# Patient Record
Sex: FEMALE | Race: WHITE | Employment: OTHER | RURAL
[De-identification: names, ages, dates, MRNs, and addresses within clinical notes are randomized per-mention and may not be internally consistent; named-entity substitution may affect disease eponyms.]

---

## 2022-03-18 PROBLEM — J45.20 MILD INTERMITTENT ASTHMA WITHOUT COMPLICATION: Status: ACTIVE | Noted: 2021-11-03

## 2022-10-04 PROBLEM — S83.282A ACUTE LATERAL MENISCUS TEAR OF LEFT KNEE: Status: RESOLVED | Noted: 2022-10-03 | Resolved: 2022-10-04

## 2023-10-12 ENCOUNTER — TRANSCRIBE ORDERS (OUTPATIENT)
Facility: HOSPITAL | Age: 73
End: 2023-10-12

## 2023-10-12 ENCOUNTER — HOSPITAL ENCOUNTER (OUTPATIENT)
Facility: HOSPITAL | Age: 73
Discharge: HOME | End: 2023-10-12
Payer: MEDICARE

## 2023-10-12 DIAGNOSIS — M99.03 SOMATIC DYSFUNCTION OF LUMBAR REGION: Primary | ICD-10-CM

## 2023-10-12 DIAGNOSIS — M99.05 SOMATIC DYSFUNCTION OF PELVIS REGION: ICD-10-CM

## 2023-10-12 DIAGNOSIS — M99.03 SOMATIC DYSFUNCTION OF LUMBAR REGION: ICD-10-CM

## 2023-10-12 PROCEDURE — 73502 X-RAY EXAM HIP UNI 2-3 VIEWS: CPT

## 2023-10-12 PROCEDURE — 72110 X-RAY EXAM L-2 SPINE 4/>VWS: CPT

## 2023-10-24 ENCOUNTER — TRANSCRIBE ORDERS (OUTPATIENT)
Dept: SCHEDULING | Age: 73
End: 2023-10-24

## 2023-10-24 DIAGNOSIS — M54.50 LUMBAR PAIN: Primary | ICD-10-CM

## 2023-10-31 ENCOUNTER — HOSPITAL ENCOUNTER (OUTPATIENT)
Facility: HOSPITAL | Age: 73
Discharge: HOME | End: 2023-11-03
Attending: ORTHOPAEDIC SURGERY
Payer: MEDICARE

## 2023-10-31 DIAGNOSIS — M16.11 PRIMARY OSTEOARTHRITIS OF RIGHT HIP: ICD-10-CM

## 2023-10-31 DIAGNOSIS — M54.16 LUMBAR BACK PAIN WITH RADICULOPATHY AFFECTING LOWER EXTREMITY: ICD-10-CM

## 2023-10-31 DIAGNOSIS — M54.40 ACUTE RIGHT-SIDED LOW BACK PAIN WITH SCIATICA, SCIATICA LATERALITY UNSPECIFIED: ICD-10-CM

## 2023-10-31 PROCEDURE — 73721 MRI JNT OF LWR EXTRE W/O DYE: CPT

## 2023-10-31 PROCEDURE — 72148 MRI LUMBAR SPINE W/O DYE: CPT

## 2023-11-01 DIAGNOSIS — M25.551 RIGHT HIP PAIN: Primary | ICD-10-CM

## 2023-11-01 RX ORDER — TRAMADOL HYDROCHLORIDE 50 MG/1
50 TABLET ORAL EVERY 6 HOURS PRN
Qty: 20 TABLET | Refills: 0 | Status: SHIPPED | OUTPATIENT
Start: 2023-11-01 | End: 2023-11-06

## 2023-11-01 NOTE — PROGRESS NOTES
With patient is followed by me in the office for intractable right hip pain. This has intensified and we are still waiting on MRI scan results of the hip. Pain medication was called in for her until we evaluate the problem better with MRI scan.

## 2023-12-08 ENCOUNTER — OFFICE VISIT (OUTPATIENT)
Age: 73
End: 2023-12-08
Payer: MEDICARE

## 2023-12-08 VITALS
SYSTOLIC BLOOD PRESSURE: 136 MMHG | TEMPERATURE: 97.5 F | WEIGHT: 145.8 LBS | BODY MASS INDEX: 25.83 KG/M2 | HEIGHT: 63 IN | HEART RATE: 100 BPM | OXYGEN SATURATION: 95 % | RESPIRATION RATE: 18 BRPM | DIASTOLIC BLOOD PRESSURE: 78 MMHG

## 2023-12-08 DIAGNOSIS — M16.0 PRIMARY OSTEOARTHRITIS OF BOTH HIPS: ICD-10-CM

## 2023-12-08 DIAGNOSIS — Z00.00 MEDICARE ANNUAL WELLNESS VISIT, SUBSEQUENT: Primary | ICD-10-CM

## 2023-12-08 DIAGNOSIS — R94.31 ABNORMAL EKG: ICD-10-CM

## 2023-12-08 DIAGNOSIS — Z01.818 PRE-OP EVALUATION: ICD-10-CM

## 2023-12-08 DIAGNOSIS — Z12.31 ENCOUNTER FOR SCREENING MAMMOGRAM FOR MALIGNANT NEOPLASM OF BREAST: ICD-10-CM

## 2023-12-08 PROCEDURE — G0439 PPPS, SUBSEQ VISIT: HCPCS | Performed by: FAMILY MEDICINE

## 2023-12-08 PROCEDURE — 1123F ACP DISCUSS/DSCN MKR DOCD: CPT | Performed by: FAMILY MEDICINE

## 2023-12-08 SDOH — ECONOMIC STABILITY: INCOME INSECURITY: HOW HARD IS IT FOR YOU TO PAY FOR THE VERY BASICS LIKE FOOD, HOUSING, MEDICAL CARE, AND HEATING?: NOT HARD AT ALL

## 2023-12-08 SDOH — ECONOMIC STABILITY: FOOD INSECURITY: WITHIN THE PAST 12 MONTHS, THE FOOD YOU BOUGHT JUST DIDN'T LAST AND YOU DIDN'T HAVE MONEY TO GET MORE.: NEVER TRUE

## 2023-12-08 SDOH — ECONOMIC STABILITY: FOOD INSECURITY: WITHIN THE PAST 12 MONTHS, YOU WORRIED THAT YOUR FOOD WOULD RUN OUT BEFORE YOU GOT MONEY TO BUY MORE.: NEVER TRUE

## 2023-12-08 SDOH — ECONOMIC STABILITY: HOUSING INSECURITY
IN THE LAST 12 MONTHS, WAS THERE A TIME WHEN YOU DID NOT HAVE A STEADY PLACE TO SLEEP OR SLEPT IN A SHELTER (INCLUDING NOW)?: NO

## 2023-12-08 ASSESSMENT — LIFESTYLE VARIABLES
HOW MANY STANDARD DRINKS CONTAINING ALCOHOL DO YOU HAVE ON A TYPICAL DAY: 1 OR 2
HOW OFTEN DO YOU HAVE A DRINK CONTAINING ALCOHOL: MONTHLY OR LESS

## 2023-12-08 ASSESSMENT — PATIENT HEALTH QUESTIONNAIRE - PHQ9
SUM OF ALL RESPONSES TO PHQ9 QUESTIONS 1 & 2: 0
SUM OF ALL RESPONSES TO PHQ QUESTIONS 1-9: 0
SUM OF ALL RESPONSES TO PHQ QUESTIONS 1-9: 0
2. FEELING DOWN, DEPRESSED OR HOPELESS: 0
SUM OF ALL RESPONSES TO PHQ QUESTIONS 1-9: 0
1. LITTLE INTEREST OR PLEASURE IN DOING THINGS: 0
SUM OF ALL RESPONSES TO PHQ QUESTIONS 1-9: 0

## 2023-12-08 NOTE — PATIENT INSTRUCTIONS
Flu shot, RSV vaccine and new COVID omicron vaccines due this fall/ winter at your pharmacy. Please get those when available, they can help prevent severe lung disease. We also recommend a bone density test, let us know when you are ready to get that ordered. We have ordered your mammogram, you can schedule that when ready. Advance Directives: Care Instructions  Overview  An advance directive is a legal way to state your wishes at the end of your life. It tells your family and your doctor what to do if you can't say what you want. There are two main types of advance directives. You can change them any time your wishes change. Living will. This form tells your family and your doctor your wishes about life support and other treatment. The form is also called a declaration. Medical power of . This form lets you name a person to make treatment decisions for you when you can't speak for yourself. This person is called a health care agent (health care proxy, health care surrogate). The form is also called a durable power of  for health care. If you do not have an advance directive, decisions about your medical care may be made by a family member, or by a doctor or a  who doesn't know you. It may help to think of an advance directive as a gift to the people who care for you. If you have one, they won't have to make tough decisions by themselves. For more information, including forms for your state, see the [address] [place] [address] website (www.caringinfo.org/planning/advance-directives/). Follow-up care is a key part of your treatment and safety. Be sure to make and go to all appointments, and call your doctor if you are having problems. It's also a good idea to know your test results and keep a list of the medicines you take. What should you include in an advance directive? Many states have a unique advance directive form.  (It may ask you to address specific issues.) Or you might use a universal form

## 2023-12-15 ENCOUNTER — HOSPITAL ENCOUNTER (OUTPATIENT)
Facility: HOSPITAL | Age: 73
End: 2023-12-15
Attending: FAMILY MEDICINE
Payer: MEDICARE

## 2023-12-15 DIAGNOSIS — R94.31 ABNORMAL EKG: ICD-10-CM

## 2023-12-15 LAB
ECHO AO ROOT DIAM: 3 CM
ECHO AV MEAN GRADIENT: 3 MMHG
ECHO AV MEAN VELOCITY: 0.8 M/S
ECHO AV PEAK GRADIENT: 5 MMHG
ECHO AV PEAK VELOCITY: 1.1 M/S
ECHO AV VTI: 25.4 CM
ECHO EST RA PRESSURE: 3 MMHG
ECHO LA DIAMETER: 2.8 CM
ECHO LA TO AORTIC ROOT RATIO: 0.93
ECHO LA VOL A-L A2C: 71 ML (ref 22–52)
ECHO LA VOL A-L A4C: 51 ML (ref 22–52)
ECHO LA VOL MOD A2C: 67 ML (ref 22–52)
ECHO LA VOL MOD A4C: 48 ML (ref 22–52)
ECHO LA VOLUME AREA LENGTH: 64 ML
ECHO LV E' LATERAL VELOCITY: 5 CM/S
ECHO LV E' SEPTAL VELOCITY: 4 CM/S
ECHO LV FRACTIONAL SHORTENING: 30 % (ref 28–44)
ECHO LV INTERNAL DIMENSION DIASTOLIC: 4.3 CM (ref 3.9–5.3)
ECHO LV INTERNAL DIMENSION SYSTOLIC: 3 CM
ECHO LV IVSD: 1.1 CM (ref 0.6–0.9)
ECHO LV MASS 2D: 173.6 G (ref 67–162)
ECHO LV POSTERIOR WALL DIASTOLIC: 1.2 CM (ref 0.6–0.9)
ECHO LV RELATIVE WALL THICKNESS RATIO: 0.56
ECHO LVOT AREA: 3.5 CM2
ECHO LVOT DIAM: 2.1 CM
ECHO MV A VELOCITY: 0.86 M/S
ECHO MV E DECELERATION TIME (DT): 264.4 MS
ECHO MV E VELOCITY: 0.53 M/S
ECHO MV E/A RATIO: 0.62
ECHO MV E/E' LATERAL: 10.6
ECHO MV E/E' RATIO (AVERAGED): 11.93
ECHO PULMONARY ARTERY END DIASTOLIC PRESSURE: 4 MMHG
ECHO PULMONARY ARTERY SYSTOLIC PRESSURE (PASP): 18 MMHG
ECHO PV REGURGITANT MAX VELOCITY: 1 M/S
ECHO RA AREA 4C: 12.6 CM2
ECHO RIGHT VENTRICULAR SYSTOLIC PRESSURE (RVSP): 18 MMHG
ECHO RV TAPSE: 2.4 CM (ref 1.7–?)
ECHO TV REGURGITANT MAX VELOCITY: 1.93 M/S
ECHO TV REGURGITANT PEAK GRADIENT: 15 MMHG

## 2023-12-15 PROCEDURE — 93306 TTE W/DOPPLER COMPLETE: CPT

## 2023-12-15 NOTE — RESULT ENCOUNTER NOTE
Echocardiogram looks good overall. Signs of thickening of the left heart, mild, not a concern for the surgery.

## 2023-12-25 ENCOUNTER — HOSPITAL ENCOUNTER (EMERGENCY)
Facility: HOSPITAL | Age: 73
Discharge: ANOTHER HOSPITAL | End: 2023-12-26
Attending: FAMILY MEDICINE
Payer: MEDICARE

## 2023-12-25 ENCOUNTER — APPOINTMENT (OUTPATIENT)
Facility: HOSPITAL | Age: 73
End: 2023-12-25
Payer: MEDICARE

## 2023-12-25 VITALS
HEART RATE: 99 BPM | TEMPERATURE: 99.8 F | BODY MASS INDEX: 27.42 KG/M2 | OXYGEN SATURATION: 91 % | RESPIRATION RATE: 16 BRPM | WEIGHT: 149 LBS | HEIGHT: 62 IN | SYSTOLIC BLOOD PRESSURE: 120 MMHG | DIASTOLIC BLOOD PRESSURE: 71 MMHG

## 2023-12-25 DIAGNOSIS — R09.02 HYPOXIA: ICD-10-CM

## 2023-12-25 DIAGNOSIS — K55.9 ISCHEMIC COLITIS (HCC): Primary | ICD-10-CM

## 2023-12-25 LAB
ALBUMIN SERPL-MCNC: 3 G/DL (ref 3.5–5)
ALBUMIN/GLOB SERPL: 0.9 (ref 1.1–2.2)
ALP SERPL-CCNC: 69 U/L (ref 45–117)
ALT SERPL-CCNC: 17 U/L (ref 12–78)
ANION GAP SERPL CALC-SCNC: 7 MMOL/L (ref 5–15)
APPEARANCE UR: CLEAR
AST SERPL-CCNC: 25 U/L (ref 15–37)
BACTERIA URNS QL MICRO: NEGATIVE /HPF
BASOPHILS # BLD: 0 K/UL (ref 0–0.1)
BASOPHILS NFR BLD: 0 % (ref 0–1)
BILIRUB SERPL-MCNC: 0.7 MG/DL (ref 0.2–1)
BILIRUB UR QL CFM: NEGATIVE
BUN SERPL-MCNC: 19 MG/DL (ref 6–20)
BUN/CREAT SERPL: 20 (ref 12–20)
CALCIUM SERPL-MCNC: 9.1 MG/DL (ref 8.5–10.1)
CHLORIDE SERPL-SCNC: 98 MMOL/L (ref 97–108)
CO2 SERPL-SCNC: 33 MMOL/L (ref 21–32)
COLOR UR: ABNORMAL
CREAT SERPL-MCNC: 0.95 MG/DL (ref 0.55–1.02)
DIFFERENTIAL METHOD BLD: ABNORMAL
EOSINOPHIL # BLD: 0.1 K/UL (ref 0–0.4)
EOSINOPHIL NFR BLD: 1 % (ref 0–7)
EPITH CASTS URNS QL MICRO: ABNORMAL /LPF
ERYTHROCYTE [DISTWIDTH] IN BLOOD BY AUTOMATED COUNT: 13.3 % (ref 11.5–14.5)
GLOBULIN SER CALC-MCNC: 3.4 G/DL (ref 2–4)
GLUCOSE SERPL-MCNC: 167 MG/DL (ref 65–100)
GLUCOSE UR STRIP.AUTO-MCNC: NEGATIVE MG/DL
HCT VFR BLD AUTO: 42.9 % (ref 35–47)
HGB BLD-MCNC: 14.1 G/DL (ref 11.5–16)
HGB UR QL STRIP: NEGATIVE
IMM GRANULOCYTES # BLD AUTO: 0.1 K/UL (ref 0–0.04)
IMM GRANULOCYTES NFR BLD AUTO: 1 % (ref 0–0.5)
KETONES UR QL STRIP.AUTO: 15 MG/DL
LACTATE SERPL-SCNC: 1.4 MMOL/L (ref 0.4–2)
LEUKOCYTE ESTERASE UR QL STRIP.AUTO: NEGATIVE
LIPASE SERPL-CCNC: 19 U/L (ref 13–75)
LYMPHOCYTES # BLD: 2 K/UL (ref 0.8–3.5)
LYMPHOCYTES NFR BLD: 14 % (ref 12–49)
MAGNESIUM SERPL-MCNC: 2 MG/DL (ref 1.6–2.4)
MCH RBC QN AUTO: 29.8 PG (ref 26–34)
MCHC RBC AUTO-ENTMCNC: 32.9 G/DL (ref 30–36.5)
MCV RBC AUTO: 90.7 FL (ref 80–99)
MONOCYTES # BLD: 1.4 K/UL (ref 0–1)
MONOCYTES NFR BLD: 10 % (ref 5–13)
NEUTS SEG # BLD: 10.2 K/UL (ref 1.8–8)
NEUTS SEG NFR BLD: 74 % (ref 32–75)
NITRITE UR QL STRIP.AUTO: NEGATIVE
NRBC # BLD: 0 K/UL (ref 0–0.01)
NRBC BLD-RTO: 0 PER 100 WBC
PH UR STRIP: 6 (ref 5–8)
PLATELET # BLD AUTO: 291 K/UL (ref 150–400)
PMV BLD AUTO: 11.1 FL (ref 8.9–12.9)
POTASSIUM SERPL-SCNC: 3.5 MMOL/L (ref 3.5–5.1)
PROT SERPL-MCNC: 6.4 G/DL (ref 6.4–8.2)
PROT UR STRIP-MCNC: ABNORMAL MG/DL
RBC # BLD AUTO: 4.73 M/UL (ref 3.8–5.2)
RBC #/AREA URNS HPF: ABNORMAL /HPF (ref 0–5)
SODIUM SERPL-SCNC: 138 MMOL/L (ref 136–145)
SP GR UR REFRACTOMETRY: 1.02 (ref 1–1.03)
URINE CULTURE IF INDICATED: ABNORMAL
UROBILINOGEN UR QL STRIP.AUTO: 0.2 EU/DL (ref 0.2–1)
WBC # BLD AUTO: 13.8 K/UL (ref 3.6–11)
WBC URNS QL MICRO: ABNORMAL /HPF (ref 0–4)

## 2023-12-25 PROCEDURE — 83735 ASSAY OF MAGNESIUM: CPT

## 2023-12-25 PROCEDURE — 71275 CT ANGIOGRAPHY CHEST: CPT

## 2023-12-25 PROCEDURE — 83880 ASSAY OF NATRIURETIC PEPTIDE: CPT

## 2023-12-25 PROCEDURE — 84484 ASSAY OF TROPONIN QUANT: CPT

## 2023-12-25 PROCEDURE — 99285 EMERGENCY DEPT VISIT HI MDM: CPT

## 2023-12-25 PROCEDURE — 83605 ASSAY OF LACTIC ACID: CPT

## 2023-12-25 PROCEDURE — 83690 ASSAY OF LIPASE: CPT

## 2023-12-25 PROCEDURE — 36415 COLL VENOUS BLD VENIPUNCTURE: CPT

## 2023-12-25 PROCEDURE — 85025 COMPLETE CBC W/AUTO DIFF WBC: CPT

## 2023-12-25 PROCEDURE — 80053 COMPREHEN METABOLIC PANEL: CPT

## 2023-12-25 PROCEDURE — 74177 CT ABD & PELVIS W/CONTRAST: CPT

## 2023-12-25 PROCEDURE — 81001 URINALYSIS AUTO W/SCOPE: CPT

## 2023-12-26 ENCOUNTER — APPOINTMENT (OUTPATIENT)
Facility: HOSPITAL | Age: 73
End: 2023-12-26
Payer: MEDICARE

## 2023-12-26 ENCOUNTER — HOSPITAL ENCOUNTER (INPATIENT)
Facility: HOSPITAL | Age: 73
LOS: 3 days | Discharge: HOME | End: 2023-12-29
Attending: EMERGENCY MEDICINE | Admitting: SURGERY
Payer: MEDICARE

## 2023-12-26 DIAGNOSIS — K59.03 DRUG-INDUCED CONSTIPATION: ICD-10-CM

## 2023-12-26 DIAGNOSIS — R10.84 DIFFUSE ABDOMINAL PAIN: ICD-10-CM

## 2023-12-26 DIAGNOSIS — K63.89 PNEUMATOSIS OF INTESTINES: Primary | ICD-10-CM

## 2023-12-26 PROBLEM — K55.9 ISCHEMIC COLITIS (HCC): Status: ACTIVE | Noted: 2023-12-26

## 2023-12-26 LAB
EKG DIAGNOSIS: NORMAL
LACTATE BLD-SCNC: 0.79 MMOL/L (ref 0.4–2)
NT PRO BNP: 354 PG/ML (ref 0–125)
STRESS BASELINE DIAS BP: 90 MMHG
STRESS BASELINE HR: 92 BPM
STRESS BASELINE ST DEPRESSION: 0 MM
STRESS BASELINE SYS BP: 141 MMHG
STRESS ESTIMATED WORKLOAD: 1 METS
STRESS O2 SAT PEAK: 95 %
STRESS O2 SAT REST: 95 %
STRESS PEAK DIAS BP: 90 MMHG
STRESS PEAK SYS BP: 141 MMHG
STRESS PERCENT HR ACHIEVED: 78 %
STRESS POST PEAK HR: 114 BPM
STRESS RATE PRESSURE PRODUCT: NORMAL BPM*MMHG
STRESS TARGET HR: 146 BPM
TROPONIN I SERPL HS-MCNC: 11 NG/L (ref 0–51)

## 2023-12-26 PROCEDURE — 2580000003 HC RX 258: Performed by: FAMILY MEDICINE

## 2023-12-26 PROCEDURE — 6370000000 HC RX 637 (ALT 250 FOR IP): Performed by: SURGERY

## 2023-12-26 PROCEDURE — 99285 EMERGENCY DEPT VISIT HI MDM: CPT

## 2023-12-26 PROCEDURE — 6360000004 HC RX CONTRAST MEDICATION: Performed by: FAMILY MEDICINE

## 2023-12-26 PROCEDURE — 93005 ELECTROCARDIOGRAM TRACING: CPT | Performed by: FAMILY MEDICINE

## 2023-12-26 PROCEDURE — 93017 CV STRESS TEST TRACING ONLY: CPT

## 2023-12-26 PROCEDURE — 78452 HT MUSCLE IMAGE SPECT MULT: CPT

## 2023-12-26 PROCEDURE — 6360000002 HC RX W HCPCS: Performed by: INTERNAL MEDICINE

## 2023-12-26 PROCEDURE — 2060000000 HC ICU INTERMEDIATE R&B

## 2023-12-26 PROCEDURE — 83605 ASSAY OF LACTIC ACID: CPT

## 2023-12-26 PROCEDURE — 2500000003 HC RX 250 WO HCPCS: Performed by: SURGERY

## 2023-12-26 PROCEDURE — A9500 TC99M SESTAMIBI: HCPCS | Performed by: INTERNAL MEDICINE

## 2023-12-26 PROCEDURE — 2580000003 HC RX 258: Performed by: SURGERY

## 2023-12-26 PROCEDURE — 99221 1ST HOSP IP/OBS SF/LOW 40: CPT | Performed by: SURGERY

## 2023-12-26 PROCEDURE — 3430000000 HC RX DIAGNOSTIC RADIOPHARMACEUTICAL: Performed by: INTERNAL MEDICINE

## 2023-12-26 PROCEDURE — 6360000002 HC RX W HCPCS: Performed by: SURGERY

## 2023-12-26 RX ORDER — 0.9 % SODIUM CHLORIDE 0.9 %
500 INTRAVENOUS SOLUTION INTRAVENOUS ONCE
Status: COMPLETED | OUTPATIENT
Start: 2023-12-26 | End: 2023-12-26

## 2023-12-26 RX ORDER — PANTOPRAZOLE SODIUM 40 MG/1
40 TABLET, DELAYED RELEASE ORAL DAILY
Status: DISCONTINUED | OUTPATIENT
Start: 2023-12-26 | End: 2023-12-29 | Stop reason: HOSPADM

## 2023-12-26 RX ORDER — FENTANYL CITRATE 50 UG/ML
25 INJECTION, SOLUTION INTRAMUSCULAR; INTRAVENOUS
Status: ACTIVE | OUTPATIENT
Start: 2023-12-26 | End: 2023-12-26

## 2023-12-26 RX ORDER — ONDANSETRON 4 MG/1
4 TABLET, ORALLY DISINTEGRATING ORAL EVERY 8 HOURS PRN
Status: DISCONTINUED | OUTPATIENT
Start: 2023-12-26 | End: 2023-12-29 | Stop reason: HOSPADM

## 2023-12-26 RX ORDER — REGADENOSON 0.08 MG/ML
0.4 INJECTION, SOLUTION INTRAVENOUS
Status: COMPLETED | OUTPATIENT
Start: 2023-12-26 | End: 2023-12-26

## 2023-12-26 RX ORDER — MORPHINE SULFATE 2 MG/ML
2 INJECTION, SOLUTION INTRAMUSCULAR; INTRAVENOUS
Status: DISCONTINUED | OUTPATIENT
Start: 2023-12-26 | End: 2023-12-29 | Stop reason: HOSPADM

## 2023-12-26 RX ORDER — TETRAKIS(2-METHOXYISOBUTYLISOCYANIDE)COPPER(I) TETRAFLUOROBORATE 1 MG/ML
10.2 INJECTION, POWDER, LYOPHILIZED, FOR SOLUTION INTRAVENOUS
Status: COMPLETED | OUTPATIENT
Start: 2023-12-26 | End: 2023-12-26

## 2023-12-26 RX ORDER — POLYETHYLENE GLYCOL 3350 17 G/17G
17 POWDER, FOR SOLUTION ORAL 2 TIMES DAILY
Status: DISCONTINUED | OUTPATIENT
Start: 2023-12-26 | End: 2023-12-29 | Stop reason: HOSPADM

## 2023-12-26 RX ORDER — SODIUM CHLORIDE 0.9 % (FLUSH) 0.9 %
5-40 SYRINGE (ML) INJECTION PRN
Status: DISCONTINUED | OUTPATIENT
Start: 2023-12-26 | End: 2023-12-29 | Stop reason: HOSPADM

## 2023-12-26 RX ORDER — DIAZEPAM 5 MG/ML
5 INJECTION, SOLUTION INTRAMUSCULAR; INTRAVENOUS EVERY 4 HOURS PRN
Status: DISCONTINUED | OUTPATIENT
Start: 2023-12-26 | End: 2023-12-29 | Stop reason: HOSPADM

## 2023-12-26 RX ORDER — IBUPROFEN 400 MG/1
400 TABLET ORAL EVERY 12 HOURS PRN
Status: DISCONTINUED | OUTPATIENT
Start: 2023-12-26 | End: 2023-12-29 | Stop reason: HOSPADM

## 2023-12-26 RX ORDER — ENOXAPARIN SODIUM 100 MG/ML
40 INJECTION SUBCUTANEOUS DAILY
Status: DISCONTINUED | OUTPATIENT
Start: 2023-12-26 | End: 2023-12-29 | Stop reason: HOSPADM

## 2023-12-26 RX ORDER — SODIUM CHLORIDE 9 MG/ML
INJECTION, SOLUTION INTRAVENOUS CONTINUOUS
Status: DISCONTINUED | OUTPATIENT
Start: 2023-12-26 | End: 2023-12-26 | Stop reason: HOSPADM

## 2023-12-26 RX ORDER — POTASSIUM CHLORIDE 7.45 MG/ML
10 INJECTION INTRAVENOUS PRN
Status: DISCONTINUED | OUTPATIENT
Start: 2023-12-26 | End: 2023-12-29 | Stop reason: HOSPADM

## 2023-12-26 RX ORDER — DEXTROSE MONOHYDRATE, SODIUM CHLORIDE, AND POTASSIUM CHLORIDE 50; 1.49; 4.5 G/1000ML; G/1000ML; G/1000ML
INJECTION, SOLUTION INTRAVENOUS CONTINUOUS
Status: DISCONTINUED | OUTPATIENT
Start: 2023-12-26 | End: 2023-12-29 | Stop reason: HOSPADM

## 2023-12-26 RX ORDER — ONDANSETRON 2 MG/ML
4 INJECTION INTRAMUSCULAR; INTRAVENOUS EVERY 6 HOURS PRN
Status: DISCONTINUED | OUTPATIENT
Start: 2023-12-26 | End: 2023-12-29 | Stop reason: HOSPADM

## 2023-12-26 RX ORDER — SODIUM CHLORIDE 9 MG/ML
INJECTION, SOLUTION INTRAVENOUS PRN
Status: DISCONTINUED | OUTPATIENT
Start: 2023-12-26 | End: 2023-12-29 | Stop reason: HOSPADM

## 2023-12-26 RX ORDER — SODIUM CHLORIDE 0.9 % (FLUSH) 0.9 %
5-40 SYRINGE (ML) INJECTION EVERY 12 HOURS SCHEDULED
Status: DISCONTINUED | OUTPATIENT
Start: 2023-12-26 | End: 2023-12-29 | Stop reason: HOSPADM

## 2023-12-26 RX ORDER — POTASSIUM CHLORIDE 20 MEQ/1
40 TABLET, EXTENDED RELEASE ORAL PRN
Status: DISCONTINUED | OUTPATIENT
Start: 2023-12-26 | End: 2023-12-29 | Stop reason: HOSPADM

## 2023-12-26 RX ORDER — TETRAKIS(2-METHOXYISOBUTYLISOCYANIDE)COPPER(I) TETRAFLUOROBORATE 1 MG/ML
30.3 INJECTION, POWDER, LYOPHILIZED, FOR SOLUTION INTRAVENOUS
Status: COMPLETED | OUTPATIENT
Start: 2023-12-26 | End: 2023-12-26

## 2023-12-26 RX ORDER — MAGNESIUM SULFATE IN WATER 40 MG/ML
2000 INJECTION, SOLUTION INTRAVENOUS PRN
Status: DISCONTINUED | OUTPATIENT
Start: 2023-12-26 | End: 2023-12-29 | Stop reason: HOSPADM

## 2023-12-26 RX ADMIN — POTASSIUM CHLORIDE, DEXTROSE MONOHYDRATE AND SODIUM CHLORIDE: 150; 5; 450 INJECTION, SOLUTION INTRAVENOUS at 15:23

## 2023-12-26 RX ADMIN — TETRAKIS(2-METHOXYISOBUTYLISOCYANIDE)COPPER(I) TETRAFLUOROBORATE 10.2 MILLICURIE: 1 INJECTION, POWDER, LYOPHILIZED, FOR SOLUTION INTRAVENOUS at 10:05

## 2023-12-26 RX ADMIN — POLYETHYLENE GLYCOL 3350 17 G: 17 POWDER, FOR SOLUTION ORAL at 14:38

## 2023-12-26 RX ADMIN — IOPAMIDOL 100 ML: 755 INJECTION, SOLUTION INTRAVENOUS at 01:21

## 2023-12-26 RX ADMIN — SODIUM CHLORIDE 500 ML: 9 INJECTION, SOLUTION INTRAVENOUS at 02:50

## 2023-12-26 RX ADMIN — TETRAKIS(2-METHOXYISOBUTYLISOCYANIDE)COPPER(I) TETRAFLUOROBORATE 30.3 MILLICURIE: 1 INJECTION, POWDER, LYOPHILIZED, FOR SOLUTION INTRAVENOUS at 13:05

## 2023-12-26 RX ADMIN — PIPERACILLIN AND TAZOBACTAM 3375 MG: 3; .375 INJECTION, POWDER, LYOPHILIZED, FOR SOLUTION INTRAVENOUS at 14:44

## 2023-12-26 RX ADMIN — SODIUM CHLORIDE, PRESERVATIVE FREE 10 ML: 5 INJECTION INTRAVENOUS at 14:54

## 2023-12-26 RX ADMIN — ENOXAPARIN SODIUM 40 MG: 100 INJECTION SUBCUTANEOUS at 14:54

## 2023-12-26 RX ADMIN — PANTOPRAZOLE SODIUM 40 MG: 40 TABLET, DELAYED RELEASE ORAL at 14:52

## 2023-12-26 RX ADMIN — REGADENOSON 0.4 MG: 0.08 INJECTION, SOLUTION INTRAVENOUS at 13:07

## 2023-12-26 ASSESSMENT — PAIN DESCRIPTION - LOCATION: LOCATION: ABDOMEN;HIP

## 2023-12-26 ASSESSMENT — PAIN - FUNCTIONAL ASSESSMENT: PAIN_FUNCTIONAL_ASSESSMENT: 0-10

## 2023-12-26 ASSESSMENT — PAIN DESCRIPTION - ORIENTATION: ORIENTATION: RIGHT

## 2023-12-26 ASSESSMENT — LIFESTYLE VARIABLES
HOW MANY STANDARD DRINKS CONTAINING ALCOHOL DO YOU HAVE ON A TYPICAL DAY: PATIENT DOES NOT DRINK
HOW OFTEN DO YOU HAVE A DRINK CONTAINING ALCOHOL: NEVER

## 2023-12-26 ASSESSMENT — PAIN DESCRIPTION - DESCRIPTORS: DESCRIPTORS: CRAMPING;ACHING;SHARP

## 2023-12-26 ASSESSMENT — PAIN SCALES - GENERAL: PAINLEVEL_OUTOF10: 8

## 2023-12-26 ASSESSMENT — PAIN DESCRIPTION - PAIN TYPE: TYPE: ACUTE PAIN

## 2023-12-26 NOTE — ED NOTES
Pt assisted to bedside commode, had large amount of loose stool at this time and reports that she is now feeling some, \"relief\"

## 2023-12-26 NOTE — ED TRIAGE NOTES
Pt arrived via EMS from home with report of abdominal pain x 3 days and constipation with a small hard bm on 12/23/23. Reports right hip replacement on 12/20/23 and was taking Dilaudid for pain - stopped taking med 2 days ago. Pt states that she took a Fleets enema at 2200 and 2 tablespoons of olive oil. Took 6 Colace today.

## 2023-12-26 NOTE — CONSULTS
Ortho:    Consulted for WB status  S/P right anterior total hip replacement, Dr. Aurora Valencia.  12/20/2023 [facility]    Dressing CDI  No collection or ttp about the right hip/thigh  No sign of LE DVT    OK to WBAT on the RLE  Anterior hip precautions  Dressing can be kept for 10 days post-op----can be replaced with opti-foam dressing as needed      Out-pt FU with primary ortho team    Jair Oliver PA-C

## 2023-12-26 NOTE — ED PROVIDER NOTES
[facility] EMERGENCY DEP  EMERGENCY DEPARTMENT ENCOUNTER       Pt Name: Rhonda Vail  MRN: [id number]  [address] [place] 1950  Date of evaluation: 12/25/2023  Provider: Arland Mortimer, MD   PCP: Sandra Johnston MD  Note Started: 11:24 PM EST 12/25/23     CHIEF COMPLAINT       Chief Complaint   Patient presents with    Abdominal Pain    Constipation        HISTORY OF PRESENT ILLNESS: 1 or more elements      History From: Patient  Limitations in obtaining HPI include None     Rhonda Vail is a 75 y.o. female who presents complaining of abdominal pain and constipation she has not had a bowel movement for the last 6 days since having had right hip replacement. She has been taking hydromorphone for pain. She complains of griping left sided abdominal discomfort. She is attempted Colace and stool softeners and has tried a fleets enema without any bowel movement. She feels as if there is a lump in her rectum. She has burning pain in the right hip area where the surgery was, which is not bad. She has no calf pain. She has no pleuritic pain. No chest pain heaviness or pressure. She does not feel short of breath with exertion, she was able to go to [place] without any dyspnea using a walker. No fever, sweats, chills. No dysuria urgency or frequency. Patient is had decreased appetite. She has noted to be hypoxic on room air upon arrival.  She does not feel short of breath, saturations were in the 90% range. Saturations noted to drop to the mid 80s with ambulation. Improved to the low 90s with nasal cannula. 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     Patient is status post right total hip replacement December 20, 2023 by Dr. Shasta Burnham, she was noted to have an abnormal EKG with incidental findings of T wave inversions prior to the procedure and referred to primary care doctor who felt she was stable for surgery after she had a transthoracic echocardiogram showing EF of 50 to 55%, mild concentric hypertrophy normal

## 2023-12-26 NOTE — CONSULTS
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 Last MPI many years ago; \"normal\"    Keep NPO. Will get MPI today since surgery is not planned and she is NPO. She has no symptoms or angina or heart failure    Thank you for this consult and allowing me to take part in this patients care. Please call with questions. [x]        High complexity decision making was performed        Subjective:   CHIEF COMPLAINT: abd pain    HISTORY OF PRESENT ILLNESS:     Martin Mcrae is a 75 y.o.  female who \"presents complaining of abdominal pain and constipation she has not had a bowel movement for the last 6 days since having had right hip replacement. She has been taking hydromorphone for pain. She complains of griping left sided abdominal discomfort. She is attempted Colace and stool softeners and has tried a fleets enema without any bowel movement. She feels as if there is a lump in her rectum. She has burning pain in the right hip area where the surgery was, which is not bad. She has no calf pain. She has no pleuritic pain. No chest pain heaviness or pressure. She does not feel short of breath with exertion, she was able to go to [facility] without any dyspnea using a walker. No fever, sweats, chills. No dysuria urgency or frequency. Patient is had decreased appetite. She has noted to be hypoxic on room air upon arrival.  She does not feel short of breath, saturations were in the 90% range. Saturations noted to drop to the mid 80s with ambulation.   Improved to the low 90s with nasal myalgias,  back pain  Neurological:  negative for headaches, dizziness, vertigo, weakness  Behavl/Psych: negative for feelings of anxiety, depression     Pertinent Positives include :    Objective:      Physical Exam:    Last 24hrs VS reviewed since prior progress note. Most recent are:    BP (!) 141/79   Pulse 85   Temp 99.2 °F (37.3 °C) (Oral)   Resp 21   Ht 1.575 m (5' 2\")   Wt 68 kg (149 lb 14.6 oz)   SpO2 96%   BMI 27.42 kg/m²   No intake or output data in the 24 hours ending 12/26/23 0853     General Appearance: Well developed, well nourished, alert & oriented x 3,    no acute distress.  Ears/Nose/Mouth/Throat: Pupils equal and round, Hearing grossly normal.  Neck: Supple.  JVP within normal limits. Carotids good upstrokes, with no bruit.  Chest: Lungs clear to auscultation bilaterally.  Cardiovascular: Regular rate and rhythm, S1S2 normal, no murmur, rubs, gallops.  Abdomen: Soft, non-tender, bowel sounds are active. No organomegaly.  Extremities: No edema bilaterally. Femoral pulses +2, Distal Pulses +1.  Skin: Warm and dry.  Neuro: CN II-XII grossly intact, Strength and sensation grossly intact.    []         Post-cath site without hematoma, bruit, tenderness, or thrill.  Distal pulses intact.    Data:      Telemetry:  SR    EKG:  SR, RBBB, anterolateral TWI  []  No new EKG for review.        Prior to Admission medications    Medication Sig Start Date End Date Taking? Authorizing Provider   mometasone (ELOCON) 0.1 % cream AAA in thin coat BID as needed for itchy rash 7/23/21   Automatic Reconciliation, Ar   mupirocin (BACTROBAN) 2 % ointment Apply topically daily 2/9/22   Automatic Reconciliation, Ar       Recent Results (from the past 24 hour(s))   CBC with Diff    Collection Time: 12/25/23 10:59 PM   Result Value Ref Range    WBC 13.8 (H) 3.6 - 11.0 K/uL    RBC 4.73 3.80 - 5.20 M/uL    Hemoglobin 14.1 11.5 - 16.0 g/dL    Hematocrit 42.9 35.0 - 47.0 %    MCV 90.7 80.0 - 99.0 FL    MCH 29.8 26.0 -

## 2023-12-26 NOTE — H&P
Subjective:      Chele Calalhan is a 73 y.o. female who is for evaluation of abdominal pain.  The pain is located in the LUQ .  Pain is described as colicky, and measures 8/10 in intensity.  Onset of pain was a few days ago. Aggravating factors include none. Alleviating factors include none. Associated symptoms include constipation.  Last BM was .  She is passing flatus.      Patient is POD#6 from a right total hip by spinal anesthesia at [facility]. The EBL was 50cc.  Patient  had an EKG on 2023 concerning for anterolateral ischemia.  Per her report,  she then had a TTE on  that was normal so she was cleared for surgery by her PCP.       She state last colonoscopy was 4 years ago and was 'normal.' Abdominal surgery is a laparoscopic umbilical hernia repair and hysterectomy and oophorectomy.        No past medical history on file.  Past Surgical History:   Procedure Laterality Date     SECTION      HERNIA REPAIR  2009    HYSTERECTOMY (CERVIX STATUS UNKNOWN)      OVARY REMOVAL       Family History   Problem Relation Age of Onset    Cancer Father      Social History     Socioeconomic History    Marital status:    Tobacco Use    Smoking status: Never     Passive exposure: Never    Smokeless tobacco: Never   Vaping Use    Vaping Use: Never used   Substance and Sexual Activity    Alcohol use: Not Currently    Drug use: Never    Sexual activity: Not Currently     Partners: Male     Social Determinants of Health     Financial Resource Strain: Low Risk  (2023)    Overall Financial Resource Strain (CARDIA)     Difficulty of Paying Living Expenses: Not hard at all   Food Insecurity: No Food Insecurity (2023)    Hunger Vital Sign     Worried About Running Out of Food in the Last Year: Never true     Ran Out of Food in the Last Year: Never true   Transportation Needs: Unknown (2023)    PRAPARE - Transportation     Lack of Transportation (Non-Medical): No   Physical Activity:  10:59 PM        Lab Results   Component Value Date/Time    AST 25 12/25/2023 10:59 PM    ALT 17 12/25/2023 10:59 PM    ALKPHOS 69 12/25/2023 10:59 PM    ALKPHOS 94 05/18/2021 10:55 AM    PROT 6.4 12/25/2023 10:59 PM        Lab Results   Component Value Date/Time    SPECGRAV 1.020 12/25/2023 11:30 PM    GLUCOSEU Negative 12/25/2023 11:30 PM    BLOODU Negative 12/25/2023 11:30 PM    UROBILINOGEN 0.2 12/25/2023 11:30 PM    LEUKOCYTESUR Negative 12/25/2023 11:30 PM     No results found for: \"HCG\"      Assessment:     75year old female with LUQ pain and constipation due to significant colon wall thickening at the splenic flexure. Differential is a mass, ischemia or infection. This is the watershed area so, an episode of hypotension could result in ischemia. No documented intraoperative hypotension on available records. Given the ischemic changes on pre-operative EKG I will ask cardiology to see her this admission. Will treat her as an ischemic colitis with antibiotics, bowel rest and IVF. Additionally, there is concern for possible wall tension ischemia of the cecum from a relative distal obstruction at the splenic flexure. She has no RLQ tenderness and her lactate is normal.  I don't thick she has cecal ischemia but,  will monitor her on telemetry and do serial exam to ensure this does not worsen. Plan:      1. I recommend proceeding with intravenous antibiotics, bowel rest and IVF    2. Admit to telemetry with serial exams and lactates     3. Cardiology consult for anterior lateral ischemic changes on EKG    4. Ortho consult for WB recommendations POD#t6 right total hip    5.   Movantik and Miralax as tolerated for constipation

## 2023-12-26 NOTE — ED PROVIDER NOTES
EMERGENCY DEPARTMENT HISTORY AND PHYSICAL EXAM     ------------------------------------------------------------------------------------------------------  Please note that this dictation was completed with [employer] the [employer] voice recognition software.  Quite often unanticipated grammatical, syntax, homophones, and other interpretive errors are inadvertently transcribed by the computer software.  Please disregard these errors.  Please excuse any errors that have escaped final proofreading.  -----------------------------------------------------------------------------------------------------------------    Date: 12/26/2023  Patient Name: Chele Callahan    History of Presenting Illness     Chief Complaint   Patient presents with    Abdominal Pain     Pt transferred from [facility] with report of possible ischemic colitis.  Pt reports going to the ER last night with c/o abdominal pain and constipation after Rt hip replacement surgery on 12/20, states her last BM was 12/19.         History Provided By: Patient    HPI: Chele Callahan is a 73 y.o. female, with significant pmhx of Recent right hip surgery, who presents via EMS  From [facility] to the ED with report of concern for ischemic colitis.  Patient notes that she has not had a bowel movement since the 19th of this month after having taken narcotic pain medication after her hip surgery.  Notes having taken multiple modalities in effort to relieve her constipation without success.  Patient had CT scan outside hospital with concern for pneumatosis of bowel.  Was sent to our facility for further evaluation by general surgery, Dr. Govea who was notified prior to patient's arrival.      HPI per OSH:  Chele Callahan is a 73 y.o. female who presents complaining of abdominal pain and constipation she has not had a bowel movement for the last 6 days since having had right hip replacement.  She has been taking hydromorphone for pain.  She complains of griping left  Albumin/Globulin Ratio 0.9 (L) 1.1 - 2.2     Lipase    Collection Time: 12/25/23 10:59 PM   Result Value Ref Range    Lipase 19 13 - 75 U/L   Lactic Acid    Collection Time: 12/25/23 10:59 PM   Result Value Ref Range    Lactic Acid, Plasma 1.4 0.4 - 2.0 MMOL/L   Magnesium    Collection Time: 12/25/23 10:59 PM   Result Value Ref Range    Magnesium 2.0 1.6 - 2.4 mg/dL   Brain Natriuretic Peptide    Collection Time: 12/25/23 10:59 PM   Result Value Ref Range    NT Pro- (H) 0 - 125 PG/ML   Troponin    Collection Time: 12/25/23 10:59 PM   Result Value Ref Range    Troponin, High Sensitivity 11 0 - 51 ng/L   Urinalysis with Reflex to Culture    Collection Time: 12/25/23 11:30 PM    Specimen: Miscellaneous sample    Urine specimen   Result Value Ref Range    Color, UA YELLOW/STRAW      Appearance CLEAR CLEAR      Specific Gravity, UA 1.020 1.003 - 1.030      pH, Urine 6.0 5.0 - 8.0      Protein, UA TRACE (A) NEG mg/dL    Glucose, UA Negative NEG mg/dL    Ketones, Urine 15 (A) NEG mg/dL    Blood, Urine Negative NEG      Urobilinogen, Urine 0.2 0.2 - 1.0 EU/dL    Nitrite, Urine Negative NEG      Leukocyte Esterase, Urine Negative NEG      WBC, UA 0-4 0 - 4 /hpf    RBC, UA 0-5 0 - 5 /hpf    Epithelial Cells UA FEW FEW /lpf    BACTERIA, URINE Negative NEG /hpf    Urine Culture if Indicated CULTURE NOT INDICATED BY UA RESULT CNI     Bilirubin, Confirmatory    Collection Time: 12/25/23 11:30 PM   Result Value Ref Range    Bilirubin Confirmation, UA Negative NEG     EKG 12 Lead    Collection Time: 12/26/23  1:30 AM   Result Value Ref Range    Ventricular Rate 87 BPM    Atrial Rate 87 BPM    P-R Interval 146 ms    QRS Duration 112 ms    Q-T Interval 368 ms    QTc Calculation (Bazett) 442 ms    P Axis 41 degrees    R Axis 65 degrees    T Axis -51 degrees    Diagnosis       Normal sinus rhythm  Low voltage QRS  ST & T wave abnormality, consider inferior ischemia  ST & T wave abnormality, consider anterolateral

## 2023-12-27 LAB
ANION GAP SERPL CALC-SCNC: 1 MMOL/L (ref 5–15)
BASOPHILS # BLD: 0.1 K/UL (ref 0–0.1)
BASOPHILS NFR BLD: 0 % (ref 0–1)
BUN SERPL-MCNC: 9 MG/DL (ref 6–20)
BUN/CREAT SERPL: 13 (ref 12–20)
CALCIUM SERPL-MCNC: 8.9 MG/DL (ref 8.5–10.1)
CEA SERPL-MCNC: 1.1 NG/ML
CHLORIDE SERPL-SCNC: 109 MMOL/L (ref 97–108)
CO2 SERPL-SCNC: 29 MMOL/L (ref 21–32)
CREAT SERPL-MCNC: 0.7 MG/DL (ref 0.55–1.02)
DIFFERENTIAL METHOD BLD: ABNORMAL
EKG ATRIAL RATE: 87 BPM
EKG DIAGNOSIS: NORMAL
EKG P AXIS: 41 DEGREES
EKG P-R INTERVAL: 146 MS
EKG Q-T INTERVAL: 368 MS
EKG QRS DURATION: 112 MS
EKG QTC CALCULATION (BAZETT): 442 MS
EKG R AXIS: 65 DEGREES
EKG T AXIS: -51 DEGREES
EKG VENTRICULAR RATE: 87 BPM
EOSINOPHIL # BLD: 0.3 K/UL (ref 0–0.4)
EOSINOPHIL NFR BLD: 2 % (ref 0–7)
ERYTHROCYTE [DISTWIDTH] IN BLOOD BY AUTOMATED COUNT: 13.5 % (ref 11.5–14.5)
GLUCOSE SERPL-MCNC: 137 MG/DL (ref 65–100)
HCT VFR BLD AUTO: 34.6 % (ref 35–47)
HGB BLD-MCNC: 11.3 G/DL (ref 11.5–16)
IMM GRANULOCYTES # BLD AUTO: 0.2 K/UL (ref 0–0.04)
IMM GRANULOCYTES NFR BLD AUTO: 1 % (ref 0–0.5)
LACTATE SERPL-SCNC: 0.7 MMOL/L (ref 0.4–2)
LYMPHOCYTES # BLD: 2.9 K/UL (ref 0.8–3.5)
LYMPHOCYTES NFR BLD: 17 % (ref 12–49)
MAGNESIUM SERPL-MCNC: 2.3 MG/DL (ref 1.6–2.4)
MCH RBC QN AUTO: 30.8 PG (ref 26–34)
MCHC RBC AUTO-ENTMCNC: 32.7 G/DL (ref 30–36.5)
MCV RBC AUTO: 94.3 FL (ref 80–99)
MONOCYTES # BLD: 1.4 K/UL (ref 0–1)
MONOCYTES NFR BLD: 8 % (ref 5–13)
NEUTS SEG # BLD: 11.9 K/UL (ref 1.8–8)
NEUTS SEG NFR BLD: 72 % (ref 32–75)
NRBC # BLD: 0 K/UL (ref 0–0.01)
NRBC BLD-RTO: 0 PER 100 WBC
PLATELET # BLD AUTO: 250 K/UL (ref 150–400)
PMV BLD AUTO: 11 FL (ref 8.9–12.9)
POTASSIUM SERPL-SCNC: 3.7 MMOL/L (ref 3.5–5.1)
RBC # BLD AUTO: 3.67 M/UL (ref 3.8–5.2)
SODIUM SERPL-SCNC: 139 MMOL/L (ref 136–145)
WBC # BLD AUTO: 16.7 K/UL (ref 3.6–11)

## 2023-12-27 PROCEDURE — 2060000000 HC ICU INTERMEDIATE R&B

## 2023-12-27 PROCEDURE — 82378 CARCINOEMBRYONIC ANTIGEN: CPT

## 2023-12-27 PROCEDURE — 2500000003 HC RX 250 WO HCPCS: Performed by: SURGERY

## 2023-12-27 PROCEDURE — 36415 COLL VENOUS BLD VENIPUNCTURE: CPT

## 2023-12-27 PROCEDURE — 80048 BASIC METABOLIC PNL TOTAL CA: CPT

## 2023-12-27 PROCEDURE — 99231 SBSQ HOSP IP/OBS SF/LOW 25: CPT | Performed by: SURGERY

## 2023-12-27 PROCEDURE — 83735 ASSAY OF MAGNESIUM: CPT

## 2023-12-27 PROCEDURE — 2700000000 HC OXYGEN THERAPY PER DAY

## 2023-12-27 PROCEDURE — 6360000002 HC RX W HCPCS: Performed by: SURGERY

## 2023-12-27 PROCEDURE — 6370000000 HC RX 637 (ALT 250 FOR IP): Performed by: SURGERY

## 2023-12-27 PROCEDURE — 85025 COMPLETE CBC W/AUTO DIFF WBC: CPT

## 2023-12-27 PROCEDURE — 2580000003 HC RX 258: Performed by: SURGERY

## 2023-12-27 PROCEDURE — 83605 ASSAY OF LACTIC ACID: CPT

## 2023-12-27 RX ORDER — ACETAMINOPHEN 325 MG/1
650 TABLET ORAL EVERY 6 HOURS PRN
Status: DISCONTINUED | OUTPATIENT
Start: 2023-12-27 | End: 2023-12-29 | Stop reason: HOSPADM

## 2023-12-27 RX ADMIN — ACETAMINOPHEN 650 MG: 325 TABLET ORAL at 18:09

## 2023-12-27 RX ADMIN — ENOXAPARIN SODIUM 40 MG: 100 INJECTION SUBCUTANEOUS at 08:57

## 2023-12-27 RX ADMIN — PIPERACILLIN AND TAZOBACTAM 3375 MG: 3; .375 INJECTION, POWDER, LYOPHILIZED, FOR SOLUTION INTRAVENOUS at 08:59

## 2023-12-27 RX ADMIN — PANTOPRAZOLE SODIUM 40 MG: 40 TABLET, DELAYED RELEASE ORAL at 08:57

## 2023-12-27 RX ADMIN — IBUPROFEN 400 MG: 400 TABLET, FILM COATED ORAL at 07:33

## 2023-12-27 RX ADMIN — POTASSIUM CHLORIDE, DEXTROSE MONOHYDRATE AND SODIUM CHLORIDE: 150; 5; 450 INJECTION, SOLUTION INTRAVENOUS at 22:26

## 2023-12-27 RX ADMIN — PIPERACILLIN AND TAZOBACTAM 3375 MG: 3; .375 INJECTION, POWDER, LYOPHILIZED, FOR SOLUTION INTRAVENOUS at 01:41

## 2023-12-27 RX ADMIN — SODIUM CHLORIDE, PRESERVATIVE FREE 10 ML: 5 INJECTION INTRAVENOUS at 21:21

## 2023-12-27 RX ADMIN — SODIUM CHLORIDE, PRESERVATIVE FREE 10 ML: 5 INJECTION INTRAVENOUS at 08:57

## 2023-12-27 RX ADMIN — PIPERACILLIN AND TAZOBACTAM 3375 MG: 3; .375 INJECTION, POWDER, LYOPHILIZED, FOR SOLUTION INTRAVENOUS at 16:38

## 2023-12-27 RX ADMIN — POTASSIUM CHLORIDE, DEXTROSE MONOHYDRATE AND SODIUM CHLORIDE: 150; 5; 450 INJECTION, SOLUTION INTRAVENOUS at 01:32

## 2023-12-27 ASSESSMENT — PAIN SCALES - GENERAL
PAINLEVEL_OUTOF10: 8
PAINLEVEL_OUTOF10: 8
PAINLEVEL_OUTOF10: 5

## 2023-12-27 ASSESSMENT — PAIN DESCRIPTION - LOCATION
LOCATION: HIP

## 2023-12-27 ASSESSMENT — PAIN DESCRIPTION - ORIENTATION
ORIENTATION: RIGHT

## 2023-12-27 ASSESSMENT — PAIN DESCRIPTION - DESCRIPTORS
DESCRIPTORS: ACHING;THROBBING
DESCRIPTORS: BURNING;ACHING

## 2023-12-27 NOTE — PROGRESS NOTES
Discussed during interdisciplinary rounds. Pt is mobilizing without difficulty. Confirmed with pt who has no concerns with mobility. No acute PT needs, will sign off. Pt with recent hip replacements and should follow back on PT protocol per her surgeon.

## 2023-12-27 NOTE — PROGRESS NOTES
End of Shift Note    Bedside shift change report given to [employer] RNs (oncoming nurse) by Jd Du RN (offgoing nurse). Report included the following information SBAR, ED Summary, Intake/Output, MAR, Recent Results, Med Rec Status, Cardiac Rhythm  , and Alarm Parameters     Shift worked:  7a-7p     Shift summary and any significant changes:     Gi consult will have U/S, Cardiology signed off, PT/OT signed off     Concerns for physician to address:  None      Zone phone for oncoming shift:   [phone]       Activity:     Number times ambulated in hallways past shift: 0  Number of times OOB to chair past shift: 0    Cardiac:   Cardiac Monitoring: Yes           Access:  Current line(s): PIV     Genitourinary:   Urinary status: voiding    Respiratory:      Chronic home O2 use?: NO  Incentive spirometer at bedside: N/A       GI:     Current diet:  ADULT DIET;  Clear Liquid  Passing flatus: YES  Tolerating current diet: YES       Pain Management:   Patient states pain is manageable on current regimen: YES    Skin:     Interventions: increase time out of bed, PT/OT consult, and limit briefs    Patient Safety:  Fall Score:    Interventions: gripper socks, pt to call before getting OOB, and stay with me (per policy)       Length of Stay:  Expected LOS: 2  Actual LOS: 7150 Kinjal Sanchez, RN

## 2023-12-27 NOTE — PROGRESS NOTES
Physician Progress Note      PATIENT:               Chris Leyva  CSN #:                  [id number]  :                       1950  ADMIT DATE:       2023 5:11 AM  DISCH DATE:  RESPONDING  PROVIDER #:        Mansi Roa MD          QUERY TEXT:    Pt admitted with ischemic colitis. Pt noted to have  wbc 16, lac 0.79, rr   24-27. If possible, please document in the progress notes and discharge   summary if you are evaluating and /or treating any of the following: The medical record reflects the following:  Risk Factors:  ischemic colitis  Clinical Indicators:  wbc 16, lac 0.79, rr 24-27  Treatment: Zosyn, ivfs  Ellyn Natarajan RN/CDI [id number]  Options provided:  -- Sepsis, present on admission  -- Ischemic colitis without Sepsis  -- Other - I will add my own diagnosis  -- Disagree - Not applicable / Not valid  -- Disagree - Clinically unable to determine / Unknown  -- Refer to Clinical Documentation Reviewer    PROVIDER RESPONSE TEXT:    Provider disagreed with this query.   No Sepsis    Query created by: Ellyn White on 2023 12:01 PM      Electronically signed by:  Mansi Roa MD 2023 12:20 PM

## 2023-12-27 NOTE — CONSULTS
a    GI CONSULTATION NOTE  Karen Mccllelan  [phone] NP in-hospital cell phone M-F until 4:30  After 5pm or on weekends, please call  for physician on call    NAME: Faviola Moses   :  1950   MRN:  [id number]   Attending: Tabatha Luu  Primary GI: Jamaica Huitron  Date/Time:  2023 1:55 PM  Assessment:   Pt presents w/ thickening of splenic flexture. WBC 16.7 up from 13.8 yesterday  CT Ap   IMPRESSION:  1. No evidence of pulmonary embolism  There is significant mural thickening involving the colon at the splenic flexure. There is also possible intramural bowel gas in the cecum. This is worrisome for ischemic colitis. Infectious or inflammatory colitis is not excluded. The common bile duct is dilated. No calcified stones are noted in the gallbladder or the radiology is subtle duct. Scope Hx  CLN 4 years ago in [facility], states there were no polyps. Plan:   Given improvement in symptoms and likely etiology of ischemic colitis, no plans for inpatient endoscopic procedure. Can ADAT  Agree w/ IV abx, and IVF given up trending WBC. Will coordinate outpatient follow up for possible outpatient colonoscopy. Agree w/ movantik while pt on narcotic pain medication, can tranistion to miralax QD or BID following this. CLD for now. Plan discussed with Dr. [place] Huitron  Subjective:     HISTORY OF PRESENT ILLNESS:     Faviola Moses is an 75 y.o. female who we are asked to see for complaint of splenic flexture thickening on CT and possible intramural gas of cecum. Pt is s/p right total hip replacement on  and was having increased constipation that has since resolved. Reports having abdominal pain that has now completely resolved. Currently being evaluated for possible cardiac ischemia, w/ normal stress test today. Denies hematemesis, hematochezia, or melena. No past medical history on file.    Past Surgical History:   Procedure Laterality Date     SECTION      HERNIA REPAIR

## 2023-12-27 NOTE — ED NOTES
Bedside and Verbal shift change report given to Polly Ganser, RN (oncoming nurse) by Sunshine Elizalde RN (offgoing nurse). Report included the following information Nurse Handoff Report, Index, ED Encounter Summary, ED SBAR, MAR, Recent Results, Med Rec Status, and Neuro Assessment. Oncoming RN aware that pt has been up to the bedside commode w/ assist for multiple Bms. Pt currently A&Ox4, on 2L nasal cannula.

## 2023-12-27 NOTE — CARE COORDINATION
Care Management Initial Assessment       RUR: 7% (Low RUR)  Readmission? No  1st IM letter given? Yes - 12/26  1st  letter given: No     12/27/23 7594   Service Assessment   Patient Orientation Alert and Oriented   Cognition Alert   History Provided By Patient   Primary Caregiver Self   Accompanied By/Relationship no one   Support Systems Spouse/Significant Other;Family Members  Franck Gilbert  [phone] and , Jennifer Cummins)   [address] is: Named in [address]   PCP Verified by CM Yes   Last Visit to PCP Within last 3 months   Prior Functional Level Independent in ADLs/IADLs   Current Functional Level Independent in ADLs/IADLs   Can patient return to prior living arrangement Yes   Ability to make needs known: Good   Family able to assist with home care needs: Yes   Would you like for me to discuss the discharge plan with any other family members/significant others, and if so, who? Yes  (Elodia Granados [phone] and sister, Jennifer Cummins)   Financial Resources [employer] None   Social/Functional History   Lives With Spouse  Janna Postin Spouse [phone])   Type of [address] [place] [address] Two level   Home Access Stairs to enter with rails   Entrance Stairs - Number of Steps 6 ELSA   Entrance Stairs - [facility]; Wheelchair-manual;Walker, rolling   Active  Yes   Occupation Retired   Discharge Planning   Type of [address] Spouse/Significant Other  (Janna Postin Spouse [phone])   [address]   Patient expects to be discharged to: House   Condition of Participation: Discharge Planning   The Plan for Transition of Care is related to the following treatment goals: Home health, IPR, and SNF   The Patient and/or Patient Representative was provided with a Choice of Provider?  Patient   The Patient and/Or Patient Representative agree with

## 2023-12-28 ENCOUNTER — APPOINTMENT (OUTPATIENT)
Facility: HOSPITAL | Age: 73
End: 2023-12-28
Payer: MEDICARE

## 2023-12-28 LAB
ANION GAP SERPL CALC-SCNC: 3 MMOL/L (ref 5–15)
BASOPHILS # BLD: 0.1 K/UL (ref 0–0.1)
BASOPHILS NFR BLD: 1 % (ref 0–1)
BUN SERPL-MCNC: 5 MG/DL (ref 6–20)
BUN/CREAT SERPL: 8 (ref 12–20)
CALCIUM SERPL-MCNC: 8.4 MG/DL (ref 8.5–10.1)
CHLORIDE SERPL-SCNC: 114 MMOL/L (ref 97–108)
CO2 SERPL-SCNC: 26 MMOL/L (ref 21–32)
CREAT SERPL-MCNC: 0.66 MG/DL (ref 0.55–1.02)
DIFFERENTIAL METHOD BLD: ABNORMAL
EOSINOPHIL # BLD: 0.5 K/UL (ref 0–0.4)
EOSINOPHIL NFR BLD: 4 % (ref 0–7)
ERYTHROCYTE [DISTWIDTH] IN BLOOD BY AUTOMATED COUNT: 13.3 % (ref 11.5–14.5)
GLUCOSE SERPL-MCNC: 126 MG/DL (ref 65–100)
HCT VFR BLD AUTO: 33 % (ref 35–47)
HGB BLD-MCNC: 10.5 G/DL (ref 11.5–16)
IMM GRANULOCYTES # BLD AUTO: 0.2 K/UL (ref 0–0.04)
IMM GRANULOCYTES NFR BLD AUTO: 2 % (ref 0–0.5)
LACTATE SERPL-SCNC: 0.6 MMOL/L (ref 0.4–2)
LYMPHOCYTES # BLD: 2.2 K/UL (ref 0.8–3.5)
LYMPHOCYTES NFR BLD: 20 % (ref 12–49)
MAGNESIUM SERPL-MCNC: 2.3 MG/DL (ref 1.6–2.4)
MCH RBC QN AUTO: 30.3 PG (ref 26–34)
MCHC RBC AUTO-ENTMCNC: 31.8 G/DL (ref 30–36.5)
MCV RBC AUTO: 95.1 FL (ref 80–99)
MONOCYTES # BLD: 0.8 K/UL (ref 0–1)
MONOCYTES NFR BLD: 7 % (ref 5–13)
NEUTS SEG # BLD: 7.2 K/UL (ref 1.8–8)
NEUTS SEG NFR BLD: 66 % (ref 32–75)
NRBC # BLD: 0 K/UL (ref 0–0.01)
NRBC BLD-RTO: 0 PER 100 WBC
PLATELET # BLD AUTO: 258 K/UL (ref 150–400)
PMV BLD AUTO: 11.1 FL (ref 8.9–12.9)
POTASSIUM SERPL-SCNC: 3.6 MMOL/L (ref 3.5–5.1)
RBC # BLD AUTO: 3.47 M/UL (ref 3.8–5.2)
SODIUM SERPL-SCNC: 143 MMOL/L (ref 136–145)
WBC # BLD AUTO: 11 K/UL (ref 3.6–11)

## 2023-12-28 PROCEDURE — 6370000000 HC RX 637 (ALT 250 FOR IP)

## 2023-12-28 PROCEDURE — 2580000003 HC RX 258: Performed by: SURGERY

## 2023-12-28 PROCEDURE — 85025 COMPLETE CBC W/AUTO DIFF WBC: CPT

## 2023-12-28 PROCEDURE — 2060000000 HC ICU INTERMEDIATE R&B

## 2023-12-28 PROCEDURE — 6370000000 HC RX 637 (ALT 250 FOR IP): Performed by: SURGERY

## 2023-12-28 PROCEDURE — 83735 ASSAY OF MAGNESIUM: CPT

## 2023-12-28 PROCEDURE — 36415 COLL VENOUS BLD VENIPUNCTURE: CPT

## 2023-12-28 PROCEDURE — 6360000002 HC RX W HCPCS: Performed by: SURGERY

## 2023-12-28 PROCEDURE — 99231 SBSQ HOSP IP/OBS SF/LOW 25: CPT | Performed by: SURGERY

## 2023-12-28 PROCEDURE — 83605 ASSAY OF LACTIC ACID: CPT

## 2023-12-28 PROCEDURE — 2500000003 HC RX 250 WO HCPCS: Performed by: SURGERY

## 2023-12-28 PROCEDURE — 80048 BASIC METABOLIC PNL TOTAL CA: CPT

## 2023-12-28 RX ORDER — LORAZEPAM 1 MG/1
1 TABLET ORAL ONCE
Status: DISCONTINUED | OUTPATIENT
Start: 2023-12-28 | End: 2023-12-28

## 2023-12-28 RX ORDER — LORAZEPAM 0.5 MG/1
0.5 TABLET ORAL ONCE
Status: COMPLETED | OUTPATIENT
Start: 2023-12-28 | End: 2023-12-28

## 2023-12-28 RX ADMIN — ENOXAPARIN SODIUM 40 MG: 100 INJECTION SUBCUTANEOUS at 09:07

## 2023-12-28 RX ADMIN — PIPERACILLIN AND TAZOBACTAM 3375 MG: 3; .375 INJECTION, POWDER, LYOPHILIZED, FOR SOLUTION INTRAVENOUS at 16:22

## 2023-12-28 RX ADMIN — LORAZEPAM 0.5 MG: 0.5 TABLET ORAL at 18:47

## 2023-12-28 RX ADMIN — PIPERACILLIN AND TAZOBACTAM 3375 MG: 3; .375 INJECTION, POWDER, LYOPHILIZED, FOR SOLUTION INTRAVENOUS at 09:11

## 2023-12-28 RX ADMIN — POTASSIUM CHLORIDE, DEXTROSE MONOHYDRATE AND SODIUM CHLORIDE: 150; 5; 450 INJECTION, SOLUTION INTRAVENOUS at 15:14

## 2023-12-28 RX ADMIN — SODIUM CHLORIDE, PRESERVATIVE FREE 10 ML: 5 INJECTION INTRAVENOUS at 20:11

## 2023-12-28 RX ADMIN — PANTOPRAZOLE SODIUM 40 MG: 40 TABLET, DELAYED RELEASE ORAL at 09:07

## 2023-12-28 RX ADMIN — IBUPROFEN 400 MG: 400 TABLET, FILM COATED ORAL at 11:48

## 2023-12-28 RX ADMIN — SODIUM CHLORIDE, PRESERVATIVE FREE 10 ML: 5 INJECTION INTRAVENOUS at 09:07

## 2023-12-28 RX ADMIN — PIPERACILLIN AND TAZOBACTAM 3375 MG: 3; .375 INJECTION, POWDER, LYOPHILIZED, FOR SOLUTION INTRAVENOUS at 00:27

## 2023-12-28 RX ADMIN — ACETAMINOPHEN 650 MG: 325 TABLET ORAL at 20:10

## 2023-12-28 ASSESSMENT — PAIN DESCRIPTION - DESCRIPTORS: DESCRIPTORS: ACHING;DISCOMFORT;DULL

## 2023-12-28 ASSESSMENT — PAIN DESCRIPTION - ORIENTATION
ORIENTATION: RIGHT
ORIENTATION: OUTER

## 2023-12-28 ASSESSMENT — PAIN SCALES - GENERAL
PAINLEVEL_OUTOF10: 0
PAINLEVEL_OUTOF10: 8
PAINLEVEL_OUTOF10: 7
PAINLEVEL_OUTOF10: 0
PAINLEVEL_OUTOF10: 0

## 2023-12-28 ASSESSMENT — PAIN DESCRIPTION - LOCATION
LOCATION: HIP
LOCATION: HIP

## 2023-12-28 NOTE — PROGRESS NOTES
Bedside and Verbal shift change report given to [address] [place] [address] (oncoming nurse) by Luke RN (offgoing nurse). Report included the following information Nurse Handoff Report. End of Shift Note    Bedside shift change report given to Luke LEAL (oncoming nurse) by So Burciaga RN (offgoing nurse). Report included the following information SBAR    Shift worked:  7p-7a     Shift summary and any significant changes:     Pt is concerned about the procedure in the morning. She was told it would be an ultrasound but an order is put in for an MRI. Went through the mri screening with her but she would like to talk to the provider about the plan of care. Concerns for physician to address:  See above     Zone phone for oncoming shift:          Activity:     Number times ambulated in hallways past shift: 0  Number of times OOB to chair past shift: 3    Cardiac:   Cardiac Monitoring: Yes           Access:  Current line(s): PIV     Genitourinary:   Urinary status: voiding    Respiratory:      Chronic home O2 use?: NO  Incentive spirometer at bedside: NO       GI:     Current diet:  ADULT DIET;  Clear Liquid  Passing flatus: YES  Tolerating current diet: YES       Pain Management:   Patient states pain is manageable on current regimen: YES    Skin:     Interventions: specialty bed and PT/OT consult    Patient Safety:  Fall Score:    Interventions: gripper socks       Length of Stay:  Expected LOS: 2  Actual LOS: 6161 Louis Barrera RN

## 2023-12-28 NOTE — PROGRESS NOTES
MRI PENDING    Signatures are needed to finalize MRI screening.     Fax to [phone] when completed    Please call [phone]  When this is done    Thank You

## 2023-12-28 NOTE — PROGRESS NOTES
GI PROGRESS NOTE  Karen Mitchell  [phone] NP in-hospital cell phone M-F until 4:30  After 5pm or on weekends, please call  for physician on call    Obinna Rowe GZM: [facility]   ATTG: [unfilled]   PCP: Genevieve Mesa MD  Date/Time:  2023 12:34 PM     Primary GI: Dr. Yakelin Beal    Reason for following: ?sichemic colitis,     Assessment:   Pt presents w/ thickening of splenic flexture. WBC down to 11.0 today  HgB noted 10.5 down from 14.1 . Denies any overt signs of bleeding. CT Ap   IMPRESSION:  1. No evidence of pulmonary embolism  There is significant mural thickening involving the colon at the splenic flexure. There is also possible intramural bowel gas in the cecum. This is worrisome for ischemic colitis. Infectious or inflammatory colitis is not excluded. The common bile duct is dilated. No calcified stones are noted in the gallbladder or the radiology is subtle duct. Scope Hx  CLN 4 years ago in [employer], states there were no polyps. Plan:   MRCP today/ or tomorrow AM.  Pt refused this morning but now fully amenable to MRCP. Given improvement in symptoms and likely etiology of ischemic colitis, no plans for inpatient endoscopic procedure. CLD for now. Agree w/ IV abx, and IVF given previous elevated white count  Will coordinate outpatient follow up for possible outpatient colonoscopy. Agree w/ movantik while pt on narcotic pain medication, can tranistion to miralax QD or BID following this. Plan discussed with Dr. Yakelin Beal  Subjective:   Discussed with RN events overnight. Patient up in chair without abdominal pain. Not distended or tender. Tolerating PO well. Having formed Bms without hematochezia or melena. No emesis or hematemesis.     Review of Systems:  Symptom Y/N Comments  Symptom Y/N Comments   Fever/Chills N   Chest Pain N    Cough N   Headaches N    Sputum N   Joint Pain N    SOB/PAK N   Pruritis/Rash N    Tolerating Diet Y

## 2023-12-28 NOTE — PLAN OF CARE
Patient currently in bed with no complaints of pain. Patient able to ambulate to bedside commode without incident.   Bed remains in lowest position and locked, call bell within reach  Problem: Pain  Goal: Verbalizes/displays adequate comfort level or baseline comfort level  Outcome: Progressing  Flowsheets  Taken 12/28/2023 0748 by Janice Carlson RN  Verbalizes/displays adequate comfort level or baseline comfort level:   Encourage patient to monitor pain and request assistance   Administer analgesics based on type and severity of pain and evaluate response   Assess pain using appropriate pain scale  Problem: Discharge Planning  Goal: Discharge to home or other facility with appropriate resources  Outcome: Progressing  Flowsheets  Taken 12/28/2023 0748 by Janice Carlson RN  Discharge to home or other facility with appropriate resources: Identify barriers to discharge with patient and caregiver    Problem: ABCDS Injury Assessment  Goal: Absence of physical injury  Outcome: Progressing  Flowsheets (Taken 12/28/2023 0748)  Absence of Physical Injury: Implement safety measures based on patient assessment     Problem: Safety - Adult  Goal: Free from fall injury  Outcome: Progressing  Flowsheets (Taken 12/28/2023 0748)  Free From Fall Injury:   Instruct family/caregiver on patient safety   Based on caregiver fall risk screen, instruct family/caregiver to ask for assistance with transferring infant if caregiver noted to have fall risk factors

## 2023-12-29 VITALS
OXYGEN SATURATION: 93 % | WEIGHT: 149.91 LBS | TEMPERATURE: 98.1 F | HEIGHT: 62 IN | SYSTOLIC BLOOD PRESSURE: 153 MMHG | DIASTOLIC BLOOD PRESSURE: 78 MMHG | BODY MASS INDEX: 27.59 KG/M2 | RESPIRATION RATE: 20 BRPM | HEART RATE: 74 BPM

## 2023-12-29 LAB
ANION GAP SERPL CALC-SCNC: 4 MMOL/L (ref 5–15)
BASOPHILS # BLD: 0.1 K/UL (ref 0–0.1)
BASOPHILS NFR BLD: 1 % (ref 0–1)
BUN SERPL-MCNC: 5 MG/DL (ref 6–20)
BUN/CREAT SERPL: 6 (ref 12–20)
CALCIUM SERPL-MCNC: 8.8 MG/DL (ref 8.5–10.1)
CHLORIDE SERPL-SCNC: 114 MMOL/L (ref 97–108)
CO2 SERPL-SCNC: 24 MMOL/L (ref 21–32)
CREAT SERPL-MCNC: 0.79 MG/DL (ref 0.55–1.02)
DIFFERENTIAL METHOD BLD: ABNORMAL
EOSINOPHIL # BLD: 0.6 K/UL (ref 0–0.4)
EOSINOPHIL NFR BLD: 7 % (ref 0–7)
ERYTHROCYTE [DISTWIDTH] IN BLOOD BY AUTOMATED COUNT: 13.2 % (ref 11.5–14.5)
GLUCOSE SERPL-MCNC: 103 MG/DL (ref 65–100)
HCT VFR BLD AUTO: 34.5 % (ref 35–47)
HGB BLD-MCNC: 11.4 G/DL (ref 11.5–16)
IMM GRANULOCYTES # BLD AUTO: 0.2 K/UL (ref 0–0.04)
IMM GRANULOCYTES NFR BLD AUTO: 2 % (ref 0–0.5)
LACTATE SERPL-SCNC: 1.3 MMOL/L (ref 0.4–2)
LYMPHOCYTES # BLD: 3.1 K/UL (ref 0.8–3.5)
LYMPHOCYTES NFR BLD: 33 % (ref 12–49)
MAGNESIUM SERPL-MCNC: 2.2 MG/DL (ref 1.6–2.4)
MCH RBC QN AUTO: 31.3 PG (ref 26–34)
MCHC RBC AUTO-ENTMCNC: 33 G/DL (ref 30–36.5)
MCV RBC AUTO: 94.8 FL (ref 80–99)
MONOCYTES # BLD: 0.7 K/UL (ref 0–1)
MONOCYTES NFR BLD: 7 % (ref 5–13)
NEUTS SEG # BLD: 4.8 K/UL (ref 1.8–8)
NEUTS SEG NFR BLD: 50 % (ref 32–75)
NRBC # BLD: 0 K/UL (ref 0–0.01)
NRBC BLD-RTO: 0 PER 100 WBC
PLATELET # BLD AUTO: 322 K/UL (ref 150–400)
PMV BLD AUTO: 11 FL (ref 8.9–12.9)
POTASSIUM SERPL-SCNC: 3.8 MMOL/L (ref 3.5–5.1)
RBC # BLD AUTO: 3.64 M/UL (ref 3.8–5.2)
SODIUM SERPL-SCNC: 142 MMOL/L (ref 136–145)
WBC # BLD AUTO: 9.4 K/UL (ref 3.6–11)

## 2023-12-29 PROCEDURE — 85025 COMPLETE CBC W/AUTO DIFF WBC: CPT

## 2023-12-29 PROCEDURE — 2580000003 HC RX 258: Performed by: SURGERY

## 2023-12-29 PROCEDURE — 99231 SBSQ HOSP IP/OBS SF/LOW 25: CPT | Performed by: SURGERY

## 2023-12-29 PROCEDURE — 83735 ASSAY OF MAGNESIUM: CPT

## 2023-12-29 PROCEDURE — 36415 COLL VENOUS BLD VENIPUNCTURE: CPT

## 2023-12-29 PROCEDURE — 80048 BASIC METABOLIC PNL TOTAL CA: CPT

## 2023-12-29 PROCEDURE — 6360000002 HC RX W HCPCS: Performed by: SURGERY

## 2023-12-29 PROCEDURE — 83605 ASSAY OF LACTIC ACID: CPT

## 2023-12-29 PROCEDURE — 6370000000 HC RX 637 (ALT 250 FOR IP): Performed by: SURGERY

## 2023-12-29 PROCEDURE — 2500000003 HC RX 250 WO HCPCS: Performed by: SURGERY

## 2023-12-29 RX ORDER — POLYETHYLENE GLYCOL 3350 17 G/17G
17 POWDER, FOR SOLUTION ORAL 2 TIMES DAILY
Qty: 507 G | Refills: 0 | Status: SHIPPED | OUTPATIENT
Start: 2023-12-29 | End: 2024-01-13

## 2023-12-29 RX ADMIN — PANTOPRAZOLE SODIUM 40 MG: 40 TABLET, DELAYED RELEASE ORAL at 08:59

## 2023-12-29 RX ADMIN — POTASSIUM CHLORIDE, DEXTROSE MONOHYDRATE AND SODIUM CHLORIDE: 150; 5; 450 INJECTION, SOLUTION INTRAVENOUS at 07:15

## 2023-12-29 RX ADMIN — ENOXAPARIN SODIUM 40 MG: 100 INJECTION SUBCUTANEOUS at 08:58

## 2023-12-29 RX ADMIN — PIPERACILLIN AND TAZOBACTAM 3375 MG: 3; .375 INJECTION, POWDER, LYOPHILIZED, FOR SOLUTION INTRAVENOUS at 00:21

## 2023-12-29 RX ADMIN — PIPERACILLIN AND TAZOBACTAM 3375 MG: 3; .375 INJECTION, POWDER, LYOPHILIZED, FOR SOLUTION INTRAVENOUS at 08:32

## 2023-12-29 RX ADMIN — SODIUM CHLORIDE, PRESERVATIVE FREE 10 ML: 5 INJECTION INTRAVENOUS at 08:59

## 2023-12-29 RX ADMIN — IBUPROFEN 400 MG: 400 TABLET, FILM COATED ORAL at 04:33

## 2023-12-29 ASSESSMENT — PAIN DESCRIPTION - LOCATION
LOCATION: HIP
LOCATION: HIP

## 2023-12-29 ASSESSMENT — PAIN SCALES - GENERAL
PAINLEVEL_OUTOF10: 2
PAINLEVEL_OUTOF10: 8
PAINLEVEL_OUTOF10: 4

## 2023-12-29 ASSESSMENT — PAIN DESCRIPTION - ORIENTATION
ORIENTATION: OUTER
ORIENTATION: RIGHT;OUTER

## 2023-12-29 ASSESSMENT — PAIN DESCRIPTION - DESCRIPTORS
DESCRIPTORS: DISCOMFORT
DESCRIPTORS: ACHING;DISCOMFORT;NAGGING

## 2023-12-29 NOTE — PROGRESS NOTES
Surgery NP Note    Spoke with patient again regarding plan of care for the day. The recommendation was additional imaging with a contrast enema to evaluate splenic flexure thickening. The patient is tearful. She expresses concern about her contrast allergy which she states is \"throat closing\" and also states she thinks we are just looking for problems when she has no pain, no bleeding and was having bowel function prior to the liquid diet. Patient reports she does not want further work-up, she just wants to go home. Discussed the above concerns with Dr. Luna Lancaster. At this point it was felt that additional imaging in the form of a CT scan should be performed to at least ensure the patient is not obstructed. This will be done with water soluble oral contrast. If this looks acceptable the patient will be given a diet and discharged. The patient should follow up with GI for colonoscopy in 6-8 weeks as recommended. Patient was offered this choice and states she is in agreement.      HANY Mcarthur - NP

## 2023-12-29 NOTE — PROGRESS NOTES
Bedside shift change report given to ELVIS Pinzon (oncoming nurse) by Redd Hassan (offgoing nurse). Report included the following information Nurse Handoff Report.

## 2023-12-29 NOTE — DISCHARGE SUMMARY
Discharge Summary    Patient ID:  Jaimee Whalen  [id number]  female  75 y.o.  1950    Admit date: 12/26/2023    Discharge date: 12/29/2023    Admitting Physician: Laura Arambula MD     Consulting Physician(s):   Treatment Team: Attending Provider: Laura Arambula MD; Consulting Physician: Laura Arambula MD; Consulting Physician: Dania Gee PA-C; Utilization Reviewer: Sebastian Narvaez RN; Consulting Physician: Scott Das MD; : Heather Camacho; Registered Nurse: Paulette Arias, RN; Registered Nurse: Darshana Mix, ELVIS                         HPI:  Pt is a 75 y.o. female who presents at this time with colitis requiring acute care monitoring and/or treatment. Problem List:   Patient Active Problem List   Diagnosis    Mild intermittent asthma without complication    Ischemic colitis [facility])        Hospital Course: The patient was placed on bowel rest and hydrated. GI was consulted and recommended no colonoscopy during this acute episode but to have one in 6-8 weeks. They did order an MRCP which the patient did not tolerate. The patient was also noted to have significant mural thickening involving the colon at the splenic flexure. Since endoscopy was not an option to evaluate this problem, it was recommended by the surgery team that the patient have a contrast enema study. She declined this stating it was a \"joke\" and that we were \"just looking for problems\". After discussing with the patient's concerns with her, it was agreed we would repeat the CT with PO contrast to ensure no obstruction, then resume diet and discharge. She understands this will not give additional information about the splenic flexure but would rule out acute problems (I.e. obstruction). Patient initially in agreement with plan but then later asked the nurse to leave AMA stating she does not want the CT scan. At the time of discharge the patient is tolerating clear liquids.  Diet advancement was not done as the

## 2023-12-29 NOTE — PROGRESS NOTES
End of Shift Note    Bedside shift change report given to [id number] [facility] (oncoming nurse) by Marcelo Arroyo RN (offgoing nurse). Report included the following information SBAR, Intake/Output, MAR, Recent Results, Med Rec Status, Cardiac Rhythm  , and Alarm Parameters     Shift worked:  7a-7p     Shift summary and any significant changes:     MRI to be completed during PM shift, ativan given. Concerns for physician to address:  none     Zone phone for oncoming shift:   [phone]       Activity:     Number times ambulated in hallways past shift: 0 patient ambulates in room  Number of times OOB to chair past shift: 3    Cardiac:   Cardiac Monitoring: Yes           Access:  Current line(s): PIV     Genitourinary:   Urinary status: voiding    Respiratory:      Chronic home O2 use?: NO  Incentive spirometer at bedside: N/A       GI:     Current diet:  ADULT DIET;  Clear Liquid  Passing flatus: YES  Tolerating current diet: YES       Pain Management:   Patient states pain is manageable on current regimen: YES    Skin:     Interventions: float heels, PT/OT consult, and limit briefs    Patient Safety:  Fall Score:    Interventions: gripper socks and pt to call before getting OOB       Length of Stay:  Expected LOS: 2  Actual LOS: [address] [facility], RN

## 2023-12-29 NOTE — DISCHARGE INSTRUCTIONS
Your imaging during this stay was concerning for ischemic colitis (inflammation of the intestine due to low blood flow)  Additionally there was wall thickening of your large intestine at the splenic flexure which could be concerning for a mass or cancer. At the time of your discharge you expressed that you did not desire additional testing for this. It was recommended that you have an interval CT scan to ensure your intestines were not blocked/obstructed. At the time of discharge you did not want this test.     Currently you are on a clear liquid diet. It is the recommendation of the surgery team that additional imaging be performed before advancing your diet. If you decide to advance your diet following discharge, it would be advised to have a low residue diet until additional work-up to rule or partial obstruction can be performed. You should follow up with Dr. Santa Gabriel of gastroenterology in 4 weeks to schedule your colonoscopy.

## 2023-12-29 NOTE — PROGRESS NOTES
I have reviewed discharge instructions with the patient and spouse. The patient and spouse verbalized understanding. Discharge medications reviewed with patient and spouse and appropriate educational materials and side effects teaching were provided. Follow-up appointments reviewed. Opportunity for questions and clarification was provided. Venous access removed without difficulty. Patient's belongings gathered and sent with patient. Patient is ready for discharge.      Ana Guardado RN

## 2024-01-01 ENCOUNTER — TELEPHONE (OUTPATIENT)
Age: 74
End: 2024-01-01

## 2024-01-01 NOTE — TELEPHONE ENCOUNTER
Contacted pt to check status s/p hosp stay. Pt/ Caregiver reports doing a lot better, taking fluids normally, passing stool normally now, and no bloody stool or fever. Rec to continue to monitor, and use the miralax PRN. Pt/Caregiver gave understanding.

## 2024-01-02 ENCOUNTER — TELEPHONE (OUTPATIENT)
Age: 74
End: 2024-01-02

## 2024-01-02 NOTE — TELEPHONE ENCOUNTER
Care Transitions Initial Follow Up Call    Outreach made within 2 business days of discharge: Yes    Patient: Chele Callahan Patient : 1950   MRN: [id number]  Reason for Admission: There are no discharge diagnoses documented for the most recent discharge.  Discharge Date: 23       Spoke with: patient    Discharge department/facility: [facility]    TCM Interactive Patient Contact:  Was patient able to fill all prescriptions: Yes  Was patient instructed to bring all medications to the follow-up visit: Yes  Is patient taking all medications as directed in the discharge summary? Yes  Does patient understand their discharge instructions: Yes  Does patient have questions or concerns that need addressed prior to 7-14 day follow up office visit: no    Scheduled appointment with PCP within 7-14 days    Follow Up  No future appointments.    Crystal Dozier MA

## 2024-01-09 ENCOUNTER — HOSPITAL ENCOUNTER (OUTPATIENT)
Facility: HOSPITAL | Age: 74
Setting detail: RECURRING SERIES
Discharge: HOME | End: 2024-01-12
Payer: MEDICARE

## 2024-01-09 PROCEDURE — 97161 PT EVAL LOW COMPLEX 20 MIN: CPT

## 2024-01-09 PROCEDURE — 97110 THERAPEUTIC EXERCISES: CPT

## 2024-01-09 NOTE — PROGRESS NOTES
MILKA ZUNIGA    ** Signature, Date and Time must be completed for valid certification **  Please sign and fax to [phone].  Thank you

## 2024-01-12 ENCOUNTER — OFFICE VISIT (OUTPATIENT)
Age: 74
End: 2024-01-12

## 2024-01-12 VITALS
SYSTOLIC BLOOD PRESSURE: 154 MMHG | WEIGHT: 147.8 LBS | BODY MASS INDEX: 27.2 KG/M2 | TEMPERATURE: 97.5 F | DIASTOLIC BLOOD PRESSURE: 88 MMHG | HEIGHT: 62 IN | OXYGEN SATURATION: 96 % | HEART RATE: 102 BPM | RESPIRATION RATE: 18 BRPM

## 2024-01-12 DIAGNOSIS — K59.03 DRUG-INDUCED CONSTIPATION: ICD-10-CM

## 2024-01-12 DIAGNOSIS — R94.31 ABNORMAL EKG: Primary | ICD-10-CM

## 2024-01-12 NOTE — PROGRESS NOTES
Chele Callahan is a 73 y.o. female presenting for/with:    Chief Complaint   Patient presents with    Follow-Up from [facility] 12/26-12/29 Colitis        Vitals:    01/12/24 1300   BP: (!) 154/88   Pulse: (!) 102   Resp: 18   Temp: 97.5 °F (36.4 °C)   TempSrc: Temporal   SpO2: 96%   Weight: 67 kg (147 lb 12.8 oz)   Height: 1.575 m (5' 2\")       Pain Scale: /10  Pain Location:     \"Have you been to the ER, urgent care clinic since your last visit?  Hospitalized since your last visit?\"    YES [facility]     “Have you seen or consulted any other health care providers outside of [facility] since your last visit?”    NO       Have you had a mammogram?”   NO              12/8/2023     1:19 PM   PHQ-9    Little interest or pleasure in doing things 0   Feeling down, depressed, or hopeless 0   PHQ-2 Score 0   PHQ-9 Total Score 0            No data to display                     12/8/2023     1:18 PM   Amb Fall Risk Assessment and TUG Test   Do you feel unsteady or are you worried about falling?  no   2 or more falls in past year? no   Fall with injury in past year? no           12/8/2023     1:00 PM   ADL ASSESSMENT   Feeding yourself No Help Needed   Getting from bed to chair No Help Needed   Getting dressed No Help Needed   Bathing or showering No Help Needed   Walk across the room (includes cane/walker) No Help Needed   Using the telphone No Help Needed   Taking your medications No Help Needed   Preparing meals No Help Needed   Managing money (expenses/bills) No Help Needed   Moderately strenuous housework (laundry) No Help Needed   Shopping for personal items (toiletries/medicines) No Help Needed   Shopping for groceries No Help Needed   Driving No Help Needed   Climbing a flight of stairs No Help Needed   Getting to places beyond walking distances No Help Needed           12/8/2023     1:00 PM   AMB Abuse Screening   Do you ever feel afraid of your partner? N   Are you in a 
preference, will observe for now, and plan repeat imaging next mo. Consider seeing DR. Bryson in [facility] if further scopes needed.    Abn EKG  Workup benign, reassuring Echo and Nuke GXT. Plan further tx per sx/ Risk factors in future.    F/U 1mo/ PRN.

## 2024-01-18 ENCOUNTER — HOSPITAL ENCOUNTER (OUTPATIENT)
Facility: HOSPITAL | Age: 74
Setting detail: RECURRING SERIES
Discharge: HOME | End: 2024-01-21
Payer: MEDICARE

## 2024-01-18 PROCEDURE — 97110 THERAPEUTIC EXERCISES: CPT

## 2024-01-18 NOTE — PROGRESS NOTES
PHYSICAL THERAPY - MEDICARE DAILY TREATMENT NOTE (updated 3/23)      Date: 2024          Patient Name:  Chele Callahan :  1950   Medical   Diagnosis:  Pain in right hip [M25.551] Treatment Diagnosis:  M25.551  RIGHT HIP PAIN    Referral Source:  Marii Bradshaw PA Insurance:   Payor: [facility] MEDICARE / Plan: ANTHEM MEDIBLUE ESSENTIAL/PLUS / Product Type: *No Product type* /                     Patient  verified yes     Visit #   Current  / Total 2 16   Time   In / Out 2 245   Total Treatment Time 45   Total Timed Codes 45   1:1 Treatment Time 45      [facility] Totals Reminder:  bill using total billable   min of TIMED therapeutic procedures and modalities.   8-22 min = 1 unit; 23-37 min = 2 units; 38-52 min = 3 units; 53-67 min = 4 units; 68-82 min = 5 units        .episode  SUBJECTIVE    Pain Level (0-10 scale): 3/10    Any medication changes, allergies to medications, adverse drug reactions, diagnosis change, or new procedure performed?: [x] No    [] Yes (see summary sheet for update)  Medications: Verified on Patient Summary List    Subjective functional status/changes:     Flare up of R hip pain last week, ended up on ER, xrays fine, patient had overstressed hip.  Patient stood for a long time at a  and used a toilet that was very low.      OBJECTIVE      Therapeutic Procedures:  Tx Min Billable or 1:1 Min (if diff from Tx Min) Procedure, Rationale, Specifics   25 25 34789 Therapeutic Exercise (timed):  increase ROM, strength, coordination, balance, and proprioception to improve patient's ability to progress to PLOF and address remaining functional goals. (see flow sheet as applicable)     Details if applicable:    Reviewed HEP and use of ice for pain relief  Educated in positioning  Patient has a a TENS unit at home, reviewed use of TENS                       25 25    Total Total       Modalities Rationale:     decrease inflammation and decrease pain to improve patient's ability to progress to

## 2024-01-25 ENCOUNTER — HOSPITAL ENCOUNTER (OUTPATIENT)
Facility: HOSPITAL | Age: 74
Setting detail: RECURRING SERIES
Discharge: HOME | End: 2024-01-28
Payer: MEDICARE

## 2024-01-25 PROCEDURE — 97530 THERAPEUTIC ACTIVITIES: CPT

## 2024-01-25 PROCEDURE — 97110 THERAPEUTIC EXERCISES: CPT

## 2024-01-25 PROCEDURE — 97016 VASOPNEUMATIC DEVICE THERAPY: CPT

## 2024-01-25 NOTE — PROGRESS NOTES
PHYSICAL THERAPY - MEDICARE DAILY TREATMENT NOTE (updated 3/23)      Date: 2024          Patient Name:  Chele Callahan :  1950   Medical   Diagnosis:  Pain in right hip [M25.551] Treatment Diagnosis:  M25.551  RIGHT HIP PAIN    Referral Source:  Marii Bradshaw PA Insurance:   Payor: [facility] MEDICARE / Plan: ANTHEM MEDIBLUE ESSENTIAL/PLUS / Product Type: *No Product type* /                     Patient  verified yes     Visit #   Current  / Total 3 16   Time   In / Out 1400 1445   Total Treatment Time 45   Total Timed Codes 15   1:1 Treatment Time 45      [facility] Totals Reminder:  bill using total billable   min of TIMED therapeutic procedures and modalities.   8-22 min = 1 unit; 23-37 min = 2 units; 38-52 min = 3 units; 53-67 min = 4 units; 68-82 min = 5 units        .episode  SUBJECTIVE    Pain Level (0-10 scale): 7/10 R hip    Any medication changes, allergies to medications, adverse drug reactions, diagnosis change, or new procedure performed?: [x] No    [] Yes (see summary sheet for update)  Medications: Verified on Patient Summary List    Subjective functional status/changes:     \"I dont know what's wrong, my R hip is really hurting\"    OBJECTIVE      Therapeutic Procedures:  Tx Min Billable or 1:1 Min (if diff from Tx Min) Procedure, Rationale, Specifics   10  22956 Therapeutic Exercise (timed):  increase ROM, strength, coordination, balance, and proprioception to improve patient's ability to progress to PLOF and address remaining functional goals. (see flow sheet as applicable)     Details if applicable:    Scifit stepper L1 x8 min, very slow on/off and 1 seated rest break needed.      20  91053 Therapeutic Activity (timed):  use of dynamic activities replicating functional movements to increase ROM, strength, coordination, balance, and proprioception in order to improve patient's ability to progress to PLOF and address remaining functional goals.  (see flow sheet as applicable)     Details if

## 2024-01-29 ENCOUNTER — HOSPITAL ENCOUNTER (OUTPATIENT)
Facility: HOSPITAL | Age: 74
Setting detail: RECURRING SERIES
Discharge: HOME | End: 2024-02-01
Payer: MEDICARE

## 2024-01-29 PROCEDURE — 97140 MANUAL THERAPY 1/> REGIONS: CPT

## 2024-01-29 NOTE — PROGRESS NOTES
PHYSICAL THERAPY - MEDICARE DAILY TREATMENT NOTE (updated 3/23)      Date: 2024          Patient Name:  Chele Callahan :  1950   Medical   Diagnosis:  Pain in right hip [M25.551] Treatment Diagnosis:  M25.551  RIGHT HIP PAIN    Referral Source:  Marii Bradshaw PA Insurance:   Payor: [facility] MEDICARE / Plan: ANTHEM MEDIBLUE ESSENTIAL/PLUS / Product Type: *No Product type* /                     Patient  verified yes     Visit #   Current  / Total 4 16   Time   In / Out 1315 1400   Total Treatment Time 45   Total Timed Codes 45   1:1 Treatment Time 45      [facility] Totals Reminder:  bill using total billable   min of TIMED therapeutic procedures and modalities.   8-22 min = 1 unit; 23-37 min = 2 units; 38-52 min = 3 units; 53-67 min = 4 units; 68-82 min = 5 units        .episode  SUBJECTIVE    Pain Level (0-10 scale): 2/10    Any medication changes, allergies to medications, adverse drug reactions, diagnosis change, or new procedure performed?: [x] No    [] Yes (see summary sheet for update)  Medications: Verified on Patient Summary List    Subjective functional status/changes:     I have been using my massagers    OBJECTIVE      Therapeutic Procedures:  Tx Min Billable or 1:1 Min (if diff from Tx Min) Procedure, Rationale, Specifics   45 45 54080 Manual Therapy (timed):  decrease pain, increase ROM, increase tissue extensibility, and decrease trigger points to improve patient's ability to progress to PLOF and address remaining functional goals.  The manual therapy interventions were performed at a separate and distinct time from the therapeutic activities interventions . (see flow sheet as applicable)     Details if applicable:        R SIJ pain posteriorly  MET- 1 set of 3 corrected    STM/TPR to ITB/quad/HS/adductors on R LE in multiple positiongs to include swiss ball 90/90     45 45    Total Total       Modalities Rationale:     decrease inflammation and decrease pain to improve patient's ability to

## 2024-02-15 ENCOUNTER — HOSPITAL ENCOUNTER (OUTPATIENT)
Facility: HOSPITAL | Age: 74
Setting detail: RECURRING SERIES
Discharge: HOME | End: 2024-02-18
Payer: MEDICARE

## 2024-02-15 PROCEDURE — 97140 MANUAL THERAPY 1/> REGIONS: CPT

## 2024-02-15 PROCEDURE — 97110 THERAPEUTIC EXERCISES: CPT

## 2024-02-15 NOTE — PROGRESS NOTES
PHYSICAL THERAPY - MEDICARE DAILY TREATMENT NOTE (updated 3/23)      Date: 2/15/2024             Patient Name:  Chele Callahan :  1950   Medical   Diagnosis:  Pain in right hip [M25.551] Treatment Diagnosis:  M25.551  RIGHT HIP PAIN    Referral Source:  Marii Bradshaw PA Insurance:   Payor: [facility] MEDICARE / Plan: ANTHEM MEDIBLUE ESSENTIAL/PLUS / Product Type: *No Product type* /                           Patient  verified yes     Visit #   Current  / Total 5 16   Time   In / Out 2 245   Total Treatment Time 45   Total Timed Codes 45   1:1 Treatment Time 45      [facility] Totals Reminder:  bill using total billable   min of TIMED therapeutic procedures and modalities.   8-22 min = 1 unit; 23-37 min = 2 units; 38-52 min = 3 units; 53-67 min = 4 units; 68-82 min = 5 units         .episode  SUBJECTIVE     Pain Level (0-10 scale): 1/10     Any medication changes, allergies to medications, adverse drug reactions, diagnosis change, or new procedure performed?: [x] No    [] Yes (see summary sheet for update)  Medications: Verified on Patient Summary List     Subjective functional status/changes:     Patient reports she feels much better , the stretching has really helped     OBJECTIVE        Therapeutic Procedures:  Tx Min Billable or 1:1 Min (if diff from Tx Min) Procedure, Rationale, Specifics   25 25 29564 Therapeutic Exercise (timed):  increase ROM, strength, coordination, balance, and proprioception to improve patient's ability to progress to PLOF and address remaining functional goals. (see flow sheet as applicable)      Details if applicable:    Patient educated in safely performing ITB stretch while following hip precautions and keeping hips flexed, issued written instructions  Reviewed current [facility]  Hamstring stretch  Gait training- patient ambulating independently with slight decreased foot clearance on R, worked on increasing foot clearance      20 20   98217 Manual Therapy (timed):  decrease pain,

## 2024-02-16 ENCOUNTER — OFFICE VISIT (OUTPATIENT)
Age: 74
End: 2024-02-16
Payer: MEDICARE

## 2024-02-16 VITALS
WEIGHT: 149.2 LBS | HEART RATE: 75 BPM | DIASTOLIC BLOOD PRESSURE: 94 MMHG | OXYGEN SATURATION: 98 % | TEMPERATURE: 97.5 F | SYSTOLIC BLOOD PRESSURE: 146 MMHG | BODY MASS INDEX: 27.46 KG/M2 | RESPIRATION RATE: 18 BRPM | HEIGHT: 62 IN

## 2024-02-16 DIAGNOSIS — H81.11 BPPV (BENIGN PAROXYSMAL POSITIONAL VERTIGO), RIGHT: ICD-10-CM

## 2024-02-16 DIAGNOSIS — Z00.00 MEDICARE ANNUAL WELLNESS VISIT, SUBSEQUENT: Primary | ICD-10-CM

## 2024-02-16 DIAGNOSIS — Z78.0 POSTMENOPAUSAL: ICD-10-CM

## 2024-02-16 PROCEDURE — 1123F ACP DISCUSS/DSCN MKR DOCD: CPT | Performed by: FAMILY MEDICINE

## 2024-02-16 PROCEDURE — G0439 PPPS, SUBSEQ VISIT: HCPCS | Performed by: FAMILY MEDICINE

## 2024-02-16 NOTE — PATIENT INSTRUCTIONS
RSV vaccine due at your pharmacy. Please get when available, can help prevent severe lung disease.       Learning About Being Active as an Older Adult  Why is being active important as you get older?     Being active is one of the best things you can do for your health. And it's never too late to start. Being active--or getting active, if you aren't already--has definite benefits. It can:  Give you more energy,  Keep your mind sharp.  Improve balance to reduce your risk of falls.  Help you manage chronic illness with fewer medicines.  No matter how old you are, how fit you are, or what health problems you have, there is a form of activity that will work for you. And the more physical activity you can do, the better your overall health will be.  What kinds of activity can help you stay healthy?  Being more active will make your daily activities easier. Physical activity includes planned exercise and things you do in daily life. There are four types of activity:  Aerobic.  Doing aerobic activity makes your heart and lungs strong.  Includes walking, dancing, and gardening.  Aim for at least 2½ hours spread throughout the week.  It improves your energy and can help you sleep better.  Muscle-strengthening.  This type of activity can help maintain muscle and strengthen bones.  Includes climbing stairs, using resistance bands, and lifting or carrying heavy loads.  Aim for at least twice a week.  It can help protect the knees and other joints.  Stretching.  Stretching gives you better range of motion in joints and muscles.  Includes upper arm stretches, calf stretches, and gentle yoga.  Aim for at least twice a week, preferably after your muscles are warmed up from other activities.  It can help you function better in daily life.  Balancing.  This helps you stay coordinated and have good posture.  Includes heel-to-toe walking, kennedy chi, and certain types of yoga.  Aim for at least 3 days a week.  It can reduce your risk of

## 2024-02-16 NOTE — PROGRESS NOTES
Chele Callahan is a 73 y.o. female presenting for/with:    Chief Complaint   Patient presents with    Medicare AWV    Hip Problem     Post surgery. Going to PT.     Dizziness     X 3 weeks, stopped the gabapentin medication 2 weeks ago thinking that was causing dizziness.        Vitals:    02/16/24 1400   BP: (!) 146/94   Pulse: 75   Resp: 18   Temp: 97.5 °F (36.4 °C)   TempSrc: Temporal   SpO2: 98%   Weight: 67.7 kg (149 lb 3.2 oz)   Height: 1.575 m (5' 2\")       Pain Scale: 0 - No pain/10  Pain Location:     \"Have you been to the ER, urgent care clinic since your last visit?  Hospitalized since your last visit?\"    NO    “Have you seen or consulted any other health care providers outside of [facility] since your last visit?”    NO       Have you had a mammogram?”   NO              2/16/2024     2:11 PM   PHQ-9    Little interest or pleasure in doing things 1   Feeling down, depressed, or hopeless 0   PHQ-2 Score 1   PHQ-9 Total Score 1            No data to display                     2/16/2024     2:11 PM   Amb Fall Risk Assessment and TUG Test   Do you feel unsteady or are you worried about falling?  no   2 or more falls in past year? no   Fall with injury in past year? no           2/16/2024     2:00 PM 12/8/2023     1:00 PM   ADL ASSESSMENT   Feeding yourself No Help Needed No Help Needed   Getting from bed to chair No Help Needed No Help Needed   Getting dressed No Help Needed No Help Needed   Bathing or showering No Help Needed No Help Needed   Walk across the room (includes cane/walker) No Help Needed No Help Needed   Using the telphone No Help Needed No Help Needed   Taking your medications No Help Needed No Help Needed   Preparing meals No Help Needed No Help Needed   Managing money (expenses/bills) No Help Needed No Help Needed   Moderately strenuous housework (laundry) No Help Needed No Help Needed   Shopping for personal items (toiletries/medicines) No Help Needed No Help Needed   Shopping

## 2024-02-16 NOTE — PROGRESS NOTES
Chele Callahan is a 73 y.o. female  Chief Complaint   Patient presents with    Medicare AWV    Hip Problem     Post surgery. Going to PT.     Dizziness     X 3 weeks, stopped the gabapentin medication 2 weeks ago thinking that was causing dizziness.      HPI:  Symptoms include vertigo, happens with rolling over, not sure which side. Onset of symptoms was about 3 weeks  ago, stable since that time. Evaluation to date: none. Treatment to date: rest.    Reviewed PMH, PSH, SH, Medications, allergies (see chart).  No current outpatient medications on file.     No current facility-administered medications for this visit.     ROS:   General: No fever, chills, or abnormal weight loss  Respiratory: No cough, dyspnea  CV: No chest pain, palpitations    Objective:  Visit Vitals  BP (!) 146/94   Pulse 75   Temp 97.5 °F (36.4 °C) (Temporal)   Resp 18   Ht 1.575 m (5' 2\")   Wt 67.7 kg (149 lb 3.2 oz)   SpO2 98%   BMI 27.29 kg/m²     Wt Readings from Last 3 Encounters:   02/16/24 67.7 kg (149 lb 3.2 oz)   01/12/24 67 kg (147 lb 12.8 oz)   12/26/23 68 kg (149 lb 14.6 oz)     BP Readings from Last 3 Encounters:   02/16/24 (!) 146/94   01/12/24 (!) 154/88   12/29/23 (!) 153/78     Physical Examination: General appearance - alert, well appearing, and in no distress  Mental status - alert, oriented to person, place, and time  Eyes - pupils equal and reactive, extraocular eye movements intact  ENT - bilateral external ears and nose normal. Normal lips  Neck - supple, no significant adenopathy, no thyromegaly or mass  Chest - clear to auscultation, no wheezes, rales or rhonchi, symmetric air entry  Heart - normal rate, regular rhythm, normal S1, S2, no murmurs, rubs, clicks or gallops  Extremities - peripheral pulses normal, no pedal edema, no clubbing or cyanosis  Neuro- CN 2-12 intact to test, nl coordination and ROM UE and LE. Nl gait. Strongly POS RAMIRO-hallpike to R side, elicits index pain.    A/p:  BPPV  Tx with R side office epley  No

## 2024-02-19 ENCOUNTER — TELEPHONE (OUTPATIENT)
Age: 74
End: 2024-02-19

## 2024-02-19 DIAGNOSIS — H81.11 BPPV (BENIGN PAROXYSMAL POSITIONAL VERTIGO), RIGHT: Primary | ICD-10-CM

## 2024-02-19 RX ORDER — MECLIZINE HYDROCHLORIDE 25 MG/1
25 TABLET ORAL 3 TIMES DAILY PRN
Qty: 30 TABLET | Refills: 0 | Status: SHIPPED | OUTPATIENT
Start: 2024-02-19

## 2024-02-19 NOTE — TELEPHONE ENCOUNTER
Pt was seen a couple days ago and wants to know if she can get a script or referral for positional vertigo for PT, Please advise callback # [phone].

## 2024-02-20 ENCOUNTER — TELEPHONE (OUTPATIENT)
Age: 74
End: 2024-02-20

## 2024-02-20 DIAGNOSIS — H81.11 BENIGN PAROXYSMAL POSITIONAL VERTIGO OF RIGHT EAR: Primary | ICD-10-CM

## 2024-02-22 ENCOUNTER — APPOINTMENT (OUTPATIENT)
Facility: HOSPITAL | Age: 74
End: 2024-02-22
Payer: MEDICARE

## 2024-02-22 ENCOUNTER — HOSPITAL ENCOUNTER (OUTPATIENT)
Facility: HOSPITAL | Age: 74
Setting detail: RECURRING SERIES
Discharge: HOME | End: 2024-02-25
Payer: MEDICARE

## 2024-02-22 PROCEDURE — 97110 THERAPEUTIC EXERCISES: CPT

## 2024-02-22 PROCEDURE — 97161 PT EVAL LOW COMPLEX 20 MIN: CPT

## 2024-02-22 NOTE — PROGRESS NOTES
[facility]  [address] [place] [address]  Office [phone]  Fax [phone]       PHYSICAL THERAPY - MEDICARE EVALUATION/PLAN OF CARE NOTE (updated 3/23)      Date: 2024          Patient Name:  Chele Callahan :  1950   Medical   Diagnosis:  Benign paroxysmal positional vertigo of right ear [H81.11] Treatment Diagnosis:  H81.11  Benign paroxysmal vertigo, right ear    Referral Source:  Braeden Dennis MD Insurance:  Payor: [facility] MEDICARE / Plan: [employer] ESSENTIAL/PLUS / Product Type: *No Product type* /             Patient  verified yes     Visit #   Current  / Total 1 16     SUBJECTIVE  Pain Level (0-10 scale): R hip pain 1-2/10  []constant []intermittent []improving []worsening []no change since onset    Any medication changes, allergies to medications, adverse drug reactions, diagnosis change, or new procedure performed?: [x] No    [] Yes (see summary sheet for update)  Medications: Verified on Patient Summary List    Subjective functional status/changes:     Patient has vertigo x 4 weeks, it has worsened this week after performing exercises given by MD.    Patient has been treated for R hip replacement recently    Onset Date: 1 month ago  Start of Care: 2024  Pt Goals: dizziness gone     OBJECTIVE    Posture:  stands with wide FLORIAN with hips flexed  Other Observations:  with cervical flex and ext and SB nystagmus noted R eye  Epley- patient presents with nystagmus both L and R head position with increased nystagmus with head turned to the R  Gait and Functional Mobility:  ambulates independently hanging onto the wall as needed, unable to ambulate in a straight path  Cervical AROM WFL  MMT: R hip 4/5, L hip 5/5, UE WFL  Neurological: Reflexes / Sensations: intact UE sensation  Objective/Functional Outcome Measure: sit to stand 4x 30 seconds limited due to dizziness          20 min [x]Eval - untimed

## 2024-02-27 ENCOUNTER — HOSPITAL ENCOUNTER (OUTPATIENT)
Facility: HOSPITAL | Age: 74
Setting detail: RECURRING SERIES
Discharge: HOME | End: 2024-03-01
Payer: MEDICARE

## 2024-02-27 PROCEDURE — 97110 THERAPEUTIC EXERCISES: CPT

## 2024-02-27 NOTE — PROGRESS NOTES
PHYSICAL THERAPY - MEDICARE DAILY TREATMENT NOTE (updated 3/23)      Date: 2024          Patient Name:  Chele Callahan :  1950   Medical   Diagnosis:  Dizziness and giddiness [R42] Treatment Diagnosis:  R42       Dizziness and giddiness    Referral Source:  Braeden Dennis MD Insurance:   Payor: [facility] MEDICARE / Plan: [employer] ESSENTIAL/PLUS / Product Type: *No Product type* /                     Patient  verified yes     Visit #   Current  / Total 2 16   Time   In / Out 2 240   Total Treatment Time 40   Total Timed Codes 40   1:1 Treatment Time 40      [facility] Totals Reminder:  bill using total billable   min of TIMED therapeutic procedures and modalities.   8-22 min = 1 unit; 23-37 min = 2 units; 38-52 min = 3 units; 53-67 min = 4 units; 68-82 min = 5 units        .episode  SUBJECTIVE    Pain Level (0-10 scale): 0    Any medication changes, allergies to medications, adverse drug reactions, diagnosis change, or new procedure performed?: [x] No    [] Yes (see summary sheet for update)  Medications: Verified on Patient Summary List    Subjective functional status/changes:     Patient reports she has been feeling better, seeing a difference, 25-30% improved.  Less dizziness with daily activities.    OBJECTIVE      Therapeutic Procedures:  Tx Min Billable or 1:1 Min (if diff from Tx Min) Procedure, Rationale, Specifics   40 40 [id number] Therapeutic Exercise (timed):  increase ROM, strength, coordination, balance, and proprioception to improve patient's ability to progress to PLOF and address remaining functional goals. (see flow sheet as applicable)     Details if applicable:    Epley 5x to L, 4x to R  Also worked on head movements and scanning  Reviewed HEP                       40 40    Total Total       [x]  Patient Education billed concurrently with other procedures   [x] Review HEP    [] Progressed/Changed HEP, detail:    [] Other detail:           Pain Level at end of session (0-10 scale):

## 2024-02-29 ENCOUNTER — HOSPITAL ENCOUNTER (OUTPATIENT)
Facility: HOSPITAL | Age: 74
Setting detail: RECURRING SERIES
End: 2024-02-29
Payer: MEDICARE

## 2024-02-29 PROCEDURE — 97110 THERAPEUTIC EXERCISES: CPT

## 2024-02-29 PROCEDURE — 97140 MANUAL THERAPY 1/> REGIONS: CPT

## 2024-02-29 NOTE — PROGRESS NOTES
PHYSICAL THERAPY - MEDICARE DAILY TREATMENT NOTE (updated 3/23)      Date: 2024             Patient Name:  Chele Callahan :  1950   Medical   Diagnosis:  Dizziness and giddiness [R42] Treatment Diagnosis:  R42       Dizziness and giddiness    Referral Source:  Braeden Dennis MD Insurance:   Payor: [facility] MEDICARE / Plan: [employer] ESSENTIAL/PLUS / Product Type: *No Product type* /                           Patient  verified yes     Visit #   Current  / Total 3 16   Time   In / Out 115 200   Total Treatment Time 45   Total Timed Codes 45   1:1 Treatment Time 45      [facility] Totals Reminder:  bill using total billable   min of TIMED therapeutic procedures and modalities.   8-22 min = 1 unit; 23-37 min = 2 units; 38-52 min = 3 units; 53-67 min = 4 units; 68-82 min = 5 units         .episode  SUBJECTIVE     Pain Level (0-10 scale): 1     Any medication changes, allergies to medications, adverse drug reactions, diagnosis change, or new procedure performed?: [x] No    [] Yes (see summary sheet for update)  Medications: Verified on Patient Summary List     Subjective functional status/changes:     Patient reports she has been feeling better, seeing a difference, 25-30% improved.  Less dizziness with daily activities.  Mild hip pain , after walking down a hill     OBJECTIVE        Therapeutic Procedures:  Tx Min Billable or 1:1 Min (if diff from Tx Min) Procedure, Rationale, Specifics   35 35 [id number] Therapeutic Exercise (timed):  increase ROM, strength, coordination, balance, and proprioception to improve patient's ability to progress to PLOF and address remaining functional goals. (see flow sheet as applicable)      Details if applicable:    Epley 3x to L, 2x to R  Also worked on head movements and scanning  Reviewed HEP  Walking in the gym and scanning   10  10    73007 Manual Therapy (timed):  decrease pain, increase ROM, increase tissue extensibility, and decrease trigger points to improve

## 2024-03-05 ENCOUNTER — HOSPITAL ENCOUNTER (OUTPATIENT)
Facility: HOSPITAL | Age: 74
Setting detail: RECURRING SERIES
Discharge: HOME | End: 2024-03-08
Payer: MEDICARE

## 2024-03-05 PROCEDURE — 97140 MANUAL THERAPY 1/> REGIONS: CPT

## 2024-03-05 PROCEDURE — 97110 THERAPEUTIC EXERCISES: CPT

## 2024-03-05 NOTE — PROGRESS NOTES
and decrease trigger points to improve patient's ability to progress to PLOF and address remaining functional goals.  The manual therapy interventions were performed at a separate and distinct time from the therapeutic activities interventions . (see flow sheet as applicable)      Details if applicable:       MFR and trigger point release bilateral iliacus, R piriformis and ITB                           45 45     Total Total           [x]  Patient Education billed concurrently with other procedures   [x] Review HEP    [] Progressed/Changed HEP, detail:    [] Other detail:              Pain Level at end of session (0-10 scale): 0        Assessment   Significant decrease in dizziness symptoms,performing increased activities. Patient is able to clean and cook without symptoms/minimal symptoms.   Patient will continue to benefit from skilled PT / OT services to modify and progress therapeutic interventions, analyze and address functional mobility deficits, and analyze and address imbalance/dizziness to address functional deficits and attain remaining goals.     Progress toward goals / Updated goals:  []  See Progress Note/Recertification     Working towards goals        PLAN  Yes  Continue plan of care     [x]  Upgrade activities as tolerated  []  Discharge due to :  []  Other:       Michelle Sharpe, PT       3/5/2024       8:57 AM

## 2024-03-07 ENCOUNTER — HOSPITAL ENCOUNTER (OUTPATIENT)
Facility: HOSPITAL | Age: 74
Setting detail: RECURRING SERIES
Discharge: HOME | End: 2024-03-10
Payer: MEDICARE

## 2024-03-07 PROCEDURE — 97110 THERAPEUTIC EXERCISES: CPT

## 2024-03-07 NOTE — PROGRESS NOTES
hip abd                                  45 45     Total Total           [x]  Patient Education billed concurrently with other procedures   [x] Review HEP    [] Progressed/Changed HEP, detail:    [] Other detail:              Pain Level at end of session (0-10 scale): 0        Assessment   Significant decrease in dizziness symptoms,performing increased activities. Patient is able to clean and cook without symptoms/minimal symptoms.   Patient will continue to benefit from skilled PT / OT services to modify and progress therapeutic interventions, analyze and address functional mobility deficits, and analyze and address imbalance/dizziness to address functional deficits and attain remaining goals.     Progress toward goals / Updated goals:  []  See Progress Note/Recertification     Working towards goals        PLAN  Yes  Continue plan of care     [x]  Upgrade activities as tolerated  []  Discharge due to :  []  Other:       Michelle Sharpe, PT       3/7/2024       10:34 AM

## 2024-03-12 ENCOUNTER — APPOINTMENT (OUTPATIENT)
Facility: HOSPITAL | Age: 74
End: 2024-03-12
Payer: MEDICARE

## 2024-03-12 DIAGNOSIS — H81.11 BPPV (BENIGN PAROXYSMAL POSITIONAL VERTIGO), RIGHT: ICD-10-CM

## 2024-03-12 RX ORDER — MECLIZINE HYDROCHLORIDE 25 MG/1
25 TABLET ORAL 3 TIMES DAILY PRN
Qty: 60 TABLET | Refills: 3 | Status: SHIPPED | OUTPATIENT
Start: 2024-03-12

## 2024-03-27 ENCOUNTER — TELEPHONE (OUTPATIENT)
Age: 74
End: 2024-03-27

## 2024-12-23 ENCOUNTER — TELEPHONE (OUTPATIENT)
Age: 74
End: 2024-12-23

## 2025-03-05 ENCOUNTER — HOSPITAL ENCOUNTER (OUTPATIENT)
Facility: HOSPITAL | Age: 75
Discharge: HOME | End: 2025-03-08
Payer: MEDICARE

## 2025-03-05 DIAGNOSIS — M25.562 LEFT KNEE PAIN, UNSPECIFIED CHRONICITY: ICD-10-CM

## 2025-03-05 PROCEDURE — 73564 X-RAY EXAM KNEE 4 OR MORE: CPT

## 2025-04-17 ENCOUNTER — OFFICE VISIT (OUTPATIENT)
Age: 75
End: 2025-04-17
Payer: MEDICARE

## 2025-04-17 VITALS — OXYGEN SATURATION: 92 % | HEART RATE: 86 BPM | RESPIRATION RATE: 18 BRPM | TEMPERATURE: 97.7 F

## 2025-04-17 DIAGNOSIS — J06.9 VIRAL URI: ICD-10-CM

## 2025-04-17 DIAGNOSIS — J02.9 SORE THROAT: Primary | ICD-10-CM

## 2025-04-17 LAB
STREP PYOGENES DNA, POC: NEGATIVE
VALID INTERNAL CONTROL, POC: YES

## 2025-04-17 PROCEDURE — 87651 STREP A DNA AMP PROBE: CPT | Performed by: NURSE PRACTITIONER

## 2025-04-17 PROCEDURE — 99213 OFFICE O/P EST LOW 20 MIN: CPT | Performed by: NURSE PRACTITIONER

## 2025-04-17 PROCEDURE — 1160F RVW MEDS BY RX/DR IN RCRD: CPT | Performed by: NURSE PRACTITIONER

## 2025-04-17 PROCEDURE — 1159F MED LIST DOCD IN RCRD: CPT | Performed by: NURSE PRACTITIONER

## 2025-04-17 PROCEDURE — 1123F ACP DISCUSS/DSCN MKR DOCD: CPT | Performed by: NURSE PRACTITIONER

## 2025-04-17 RX ORDER — ALBUTEROL SULFATE 0.83 MG/ML
2.5 SOLUTION RESPIRATORY (INHALATION) 4 TIMES DAILY PRN
Qty: 120 EACH | Refills: 3 | Status: SHIPPED | OUTPATIENT
Start: 2025-04-17

## 2025-04-17 SDOH — ECONOMIC STABILITY: FOOD INSECURITY: WITHIN THE PAST 12 MONTHS, YOU WORRIED THAT YOUR FOOD WOULD RUN OUT BEFORE YOU GOT MONEY TO BUY MORE.: NEVER TRUE

## 2025-04-17 SDOH — ECONOMIC STABILITY: FOOD INSECURITY: WITHIN THE PAST 12 MONTHS, THE FOOD YOU BOUGHT JUST DIDN'T LAST AND YOU DIDN'T HAVE MONEY TO GET MORE.: NEVER TRUE

## 2025-04-17 ASSESSMENT — PATIENT HEALTH QUESTIONNAIRE - PHQ9
1. LITTLE INTEREST OR PLEASURE IN DOING THINGS: NOT AT ALL
2. FEELING DOWN, DEPRESSED OR HOPELESS: NOT AT ALL
SUM OF ALL RESPONSES TO PHQ QUESTIONS 1-9: 0

## 2025-04-17 NOTE — PROGRESS NOTES
Chele Callahan is a 74 y.o. female presenting for/with:    Chief Complaint   Patient presents with    Sore Throat     Sore throat, cough, low grade fever. Vomited yesterday. Declines Covid/Flu testing.        Vitals:    04/17/25 1300   Pulse: 86   Resp: 18   Temp: 97.7 °F (36.5 °C)   TempSrc: Temporal   SpO2: 92%       Pain Scale: /10  Pain Location:     \"Have you been to the ER, urgent care clinic since your last visit?  Hospitalized since your last visit?\"    NO    “Have you seen or consulted any other health care providers outside of [facility] since your last visit?”    NO                 4/17/2025     1:42 PM   PHQ-9    Little interest or pleasure in doing things 0   Feeling down, depressed, or hopeless 0   PHQ-2 Score 0   PHQ-9 Total Score 0            No data to display                     4/17/2025     1:42 PM   Amb Fall Risk Assessment and TUG Test   Do you feel unsteady or are you worried about falling?  no   2 or more falls in past year? no   Fall with injury in past year? no           4/17/2025     1:00 PM 2/16/2024     2:00 PM 12/8/2023     1:00 PM   ADL ASSESSMENT   Feeding yourself No Help Needed No Help Needed No Help Needed   Getting from bed to chair No Help Needed No Help Needed No Help Needed   Getting dressed No Help Needed No Help Needed No Help Needed   Bathing or showering No Help Needed No Help Needed No Help Needed   Walk across the room (includes cane/walker) No Help Needed No Help Needed No Help Needed   Using the telphone No Help Needed No Help Needed No Help Needed   Taking your medications No Help Needed No Help Needed No Help Needed   Preparing meals No Help Needed No Help Needed No Help Needed   Managing money (expenses/bills) No Help Needed No Help Needed No Help Needed   Moderately strenuous housework (laundry) No Help Needed No Help Needed No Help Needed   Shopping for personal items (toiletries/medicines) No Help Needed No Help Needed No Help Needed   Shopping for

## 2025-04-17 NOTE — PROGRESS NOTES
Chief Complaint   Patient presents with    Sore Throat     Sore throat, cough, low grade fever. Vomited yesterday. Declines Covid/Flu testing.          HPI:       is a 74 y.o. female.      New Issues:  She presents with her  for a sick visit.  She reports she was in a reenactment on Saturday where it was very cold.  She was [place] [employer] [place]  and started with a sore throat and hoarse voice.  This progressed to a low grade fever.  She also has a cough, though she reports she has been prone to a cough since she was a child.     Allergies   Allergen Reactions    Aspirin Other (See Comments) and Anaphylaxis     Facial edema    Povidone-Iodine Anaphylaxis and Itching     Patient states throat closes.     Sulfa Antibiotics Other (See Comments) and Photosensitivity    Shellfish Allergy Other (See Comments)     Throat closing. Finger swelling    Trazodone     Nickel Itching       Current Outpatient Medications   Medication Sig Dispense Refill    meclizine (ANTIVERT) 25 MG tablet Take 1 tablet by mouth 3 times daily as needed for Dizziness 60 tablet 3     No current facility-administered medications for this visit.        History reviewed. No pertinent past medical history.    Past Surgical History:   Procedure Laterality Date     SECTION      HERNIA REPAIR  2009    HYSTERECTOMY (CERVIX STATUS UNKNOWN)      OVARY REMOVAL         Social History     Socioeconomic History    Marital status:      Spouse name: None    Number of children: None    Years of education: None    Highest education level: None   Tobacco Use    Smoking status: Never     Passive exposure: Never    Smokeless tobacco: Never   Vaping Use    Vaping status: Never Used   Substance and Sexual Activity    Alcohol use: Not Currently    Drug use: Never    Sexual activity: Not Currently     Partners: Male     Social Drivers of Health     Financial Resource Strain: Low Risk  (2023)    Overall Financial Resource Strain

## 2025-04-21 ENCOUNTER — OFFICE VISIT (OUTPATIENT)
Age: 75
End: 2025-04-21
Payer: MEDICARE

## 2025-04-21 VITALS — TEMPERATURE: 97.8 F | HEART RATE: 74 BPM | RESPIRATION RATE: 18 BRPM | OXYGEN SATURATION: 95 %

## 2025-04-21 DIAGNOSIS — R05.1 ACUTE COUGH: ICD-10-CM

## 2025-04-21 DIAGNOSIS — J20.9 ACUTE BRONCHITIS, UNSPECIFIED ORGANISM: Primary | ICD-10-CM

## 2025-04-21 PROCEDURE — 99213 OFFICE O/P EST LOW 20 MIN: CPT | Performed by: FAMILY MEDICINE

## 2025-04-21 PROCEDURE — 1160F RVW MEDS BY RX/DR IN RCRD: CPT | Performed by: FAMILY MEDICINE

## 2025-04-21 PROCEDURE — 1123F ACP DISCUSS/DSCN MKR DOCD: CPT | Performed by: FAMILY MEDICINE

## 2025-04-21 PROCEDURE — 1126F AMNT PAIN NOTED NONE PRSNT: CPT | Performed by: FAMILY MEDICINE

## 2025-04-21 PROCEDURE — 1159F MED LIST DOCD IN RCRD: CPT | Performed by: FAMILY MEDICINE

## 2025-04-21 RX ORDER — PREDNISONE 20 MG/1
TABLET ORAL
Qty: 11 TABLET | Refills: 0 | Status: SHIPPED | OUTPATIENT
Start: 2025-04-21 | End: 2025-04-30

## 2025-04-21 RX ORDER — LORATADINE 10 MG/1
10 TABLET ORAL DAILY PRN
Qty: 90 TABLET | Refills: 1 | Status: SHIPPED | OUTPATIENT
Start: 2025-04-21

## 2025-04-21 RX ORDER — CODEINE PHOSPHATE AND GUAIFENESIN 10; 100 MG/5ML; MG/5ML
5 SOLUTION ORAL 3 TIMES DAILY PRN
Qty: 118 ML | Refills: 0 | Status: SHIPPED | OUTPATIENT
Start: 2025-04-21 | End: 2025-05-01

## 2025-04-21 ASSESSMENT — PATIENT HEALTH QUESTIONNAIRE - PHQ9
1. LITTLE INTEREST OR PLEASURE IN DOING THINGS: NOT AT ALL
SUM OF ALL RESPONSES TO PHQ QUESTIONS 1-9: 0
SUM OF ALL RESPONSES TO PHQ QUESTIONS 1-9: 0
2. FEELING DOWN, DEPRESSED OR HOPELESS: NOT AT ALL
SUM OF ALL RESPONSES TO PHQ QUESTIONS 1-9: 0
SUM OF ALL RESPONSES TO PHQ QUESTIONS 1-9: 0

## 2025-04-21 NOTE — PROGRESS NOTES
Chele Callahan is a 74 y.o. female  Chief Complaint   Patient presents with    Cough     C/o cough and wheezing ... Using Albuterol prn       HPI:  Symptoms include cough and upper chest congestion. Onset of symptoms was about 10d ago, stable since that time. Evaluation to date: seen in clinic at [facility] last week. Treatment to date: rest, fluids, albuterol nebs, somewhat helpful.      Reviewed PMH, PSH, SH, Medications, allergies (see chart).  Current Outpatient Medications   Medication Sig    albuterol (PROVENTIL) (2.5 MG/3ML) 0.083% nebulizer solution Take 3 mLs by nebulization 4 times daily as needed for Wheezing    meclizine (ANTIVERT) 25 MG tablet Take 1 tablet by mouth 3 times daily as needed for Dizziness     No current facility-administered medications for this visit.     ROS:   General: No fever, chills, or abnormal weight loss  Respiratory: No cough, dyspnea  CV: No chest pain, palpitations    Objective:  Visit Vitals  Pulse 74   Temp 97.8 °F (36.6 °C) (Temporal)   Resp 18   SpO2 95%     Wt Readings from Last 3 Encounters:   02/16/24 67.7 kg (149 lb 3.2 oz)   01/12/24 67 kg (147 lb 12.8 oz)   12/26/23 68 kg (149 lb 14.6 oz)     BP Readings from Last 3 Encounters:   02/16/24 (!) 146/94   01/12/24 (!) 154/88   12/29/23 (!) 153/78     Physical Examination: General appearance - alert, well appearing, and in no distress  Mental status - alert, oriented to person, place, and time  Eyes - pupils equal and reactive, extraocular eye movements intact  ENT - bilateral external ears and nose normal. Normal lips  Neck - supple, no significant adenopathy, no thyromegaly or mass  Chest - clear to auscultation, no wheezes, rales or rhonchi, symmetric air entry  Heart - normal rate, regular rhythm, normal S1, S2, no murmurs, rubs, clicks or gallops  Extremities - peripheral pulses normal, no pedal edema, no clubbing or cyanosis    A/p: Acute bronchitis, persistent, with barking cough  Not significantly improved with

## 2025-04-21 NOTE — PROGRESS NOTES
Chele Callahan is a 74 y.o. female presenting for/with:    Chief Complaint   Patient presents with    Cough     C/o cough and wheezing ... Using Albuterol prn       Vitals:    04/21/25 1132   Pulse: 74   Resp: 18   Temp: 97.8 °F (36.6 °C)   TempSrc: Temporal   SpO2: 95%       Pain Scale: 0 - No pain/10  Pain Location:     \"Have you been to the ER, urgent care clinic since your last visit?  Hospitalized since your last visit?\"    NO    “Have you seen or consulted any other health care providers outside of [facility] since your last visit?”    NO                 4/21/2025    11:31 AM   PHQ-9    Little interest or pleasure in doing things 0   Feeling down, depressed, or hopeless 0   PHQ-2 Score 0   PHQ-9 Total Score 0            No data to display                     4/17/2025     1:42 PM   Amb Fall Risk Assessment and TUG Test   Do you feel unsteady or are you worried about falling?  no   2 or more falls in past year? no   Fall with injury in past year? no           4/21/2025    11:00 AM 4/17/2025     1:00 PM 2/16/2024     2:00 PM 12/8/2023     1:00 PM   ADL ASSESSMENT   Feeding yourself No Help Needed No Help Needed No Help Needed No Help Needed   Getting from bed to chair No Help Needed No Help Needed No Help Needed No Help Needed   Getting dressed No Help Needed No Help Needed No Help Needed No Help Needed   Bathing or showering No Help Needed No Help Needed No Help Needed No Help Needed   Walk across the room (includes cane/walker) No Help Needed No Help Needed No Help Needed No Help Needed   Using the telphone No Help Needed No Help Needed No Help Needed No Help Needed   Taking your medications No Help Needed No Help Needed No Help Needed No Help Needed   Preparing meals No Help Needed No Help Needed No Help Needed No Help Needed   Managing money (expenses/bills) No Help Needed No Help Needed No Help Needed No Help Needed   Moderately strenuous housework (laundry) No Help Needed No Help Needed No

## 2025-05-14 ENCOUNTER — HOSPITAL ENCOUNTER (OUTPATIENT)
Facility: HOSPITAL | Age: 75
Discharge: HOME | End: 2025-05-17
Payer: MEDICARE

## 2025-05-14 ENCOUNTER — OFFICE VISIT (OUTPATIENT)
Age: 75
End: 2025-05-14
Payer: MEDICARE

## 2025-05-14 VITALS
WEIGHT: 150.2 LBS | TEMPERATURE: 97 F | OXYGEN SATURATION: 92 % | BODY MASS INDEX: 27.64 KG/M2 | DIASTOLIC BLOOD PRESSURE: 91 MMHG | HEIGHT: 62 IN | RESPIRATION RATE: 18 BRPM | SYSTOLIC BLOOD PRESSURE: 115 MMHG | HEART RATE: 103 BPM
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
## 2025-05-14 DIAGNOSIS — M79.671 RIGHT FOOT PAIN: ICD-10-CM

## 2025-05-14 DIAGNOSIS — M54.50 ACUTE RIGHT-SIDED LOW BACK PAIN WITHOUT SCIATICA: ICD-10-CM
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
## 2025-05-14 DIAGNOSIS — M54.2 POSTERIOR NECK PAIN: ICD-10-CM

## 2025-05-14 DIAGNOSIS — M54.6 ACUTE MIDLINE THORACIC BACK PAIN: ICD-10-CM

## 2025-05-14 DIAGNOSIS — Z78.0 POSTMENOPAUSAL: ICD-10-CM

## 2025-05-14 PROCEDURE — 1123F ACP DISCUSS/DSCN MKR DOCD: CPT | Performed by: FAMILY MEDICINE

## 2025-05-14 PROCEDURE — 73620 X-RAY EXAM OF FOOT: CPT

## 2025-05-14 PROCEDURE — 72110 X-RAY EXAM L-2 SPINE 4/>VWS: CPT

## 2025-05-14 PROCEDURE — 1159F MED LIST DOCD IN RCRD: CPT | Performed by: FAMILY MEDICINE

## 2025-05-14 PROCEDURE — 1160F RVW MEDS BY RX/DR IN RCRD: CPT | Performed by: FAMILY MEDICINE

## 2025-05-14 PROCEDURE — 99214 OFFICE O/P EST MOD 30 MIN: CPT | Performed by: FAMILY MEDICINE

## 2025-05-14 PROCEDURE — 1125F AMNT PAIN NOTED PAIN PRSNT: CPT | Performed by: FAMILY MEDICINE

## 2025-05-14 PROCEDURE — 72050 X-RAY EXAM NECK SPINE 4/5VWS: CPT

## 2025-05-14 PROCEDURE — 72070 X-RAY EXAM THORAC SPINE 2VWS: CPT

## 2025-05-14 NOTE — PROGRESS NOTES
Chele Callahan is a 75 y.o. female  Chief Complaint   Patient presents with    Medicare AWV     Patient would like to postpone wellness until neck issue is resolved.     Neck Pain     [place]ell [place] dock 5[place], landed on head. Having neck pain.        HPI:  Symptoms include R neck and R shoulder pain starting 5/1. PT stumbled while carrying heavy load on the dock, fell straight off the dock into shallow water (3.5-4' deep). No LOC. Able to rise and walk immediately, but has had gradually worsening soreness to the R posterior neck and low back on the R. No LE weakness of the leg, no incontinence. Evaluation to date: seen by chiropractor, didn't have any adjustments, just some muscle energy treatments and tissue massage, which was somewhat helpful. treatment to date: motrin 800mg BID, helps, sheyla well, no GI upset.    Reviewed PMH, PSH, SH, Medications, allergies (see chart).  Current Outpatient Medications   Medication Sig    loratadine (CLARITIN) 10 MG tablet Take 1 tablet by mouth daily as needed (allergies)    albuterol (PROVENTIL) (2.5 MG/3ML) 0.083% nebulizer solution Take 3 mLs by nebulization 4 times daily as needed for Wheezing    meclizine (ANTIVERT) 25 MG tablet Take 1 tablet by mouth 3 times daily as needed for Dizziness     No current facility-administered medications for this visit.     ROS:   General: No fever, chills, or abnormal weight loss  Respiratory: No cough, dyspnea  CV: No chest pain, palpitations    Objective:  Visit Vitals  BP (!) 115/91   Pulse (!) 103   Temp 97 °F (36.1 °C) (Temporal)   Resp 18   Ht 1.575 m (5' 2\")   Wt 68.1 kg (150 lb 3.2 oz)   SpO2 92%   BMI 27.47 kg/m²     Wt Readings from Last 3 Encounters:   05/14/25 68.1 kg (150 lb 3.2 oz)   02/16/24 67.7 kg (149 lb 3.2 oz)   01/12/24 67 kg (147 lb 12.8 oz)     BP Readings from Last 3 Encounters:   05/14/25 (!) 115/91   02/16/24 (!) 146/94   01/12/24 (!) 154/88     Physical Examination: General appearance - alert, well appearing, and in no

## 2025-05-14 NOTE — PROGRESS NOTES
Chele Callahan is a 75 y.o. female presenting for/with:    Chief Complaint   Patient presents with    Medicare AWV     Patient would like to postpone wellness until neck issue is resolved.     Neck Pain     Fell off dock 5/1, landed on head. Having neck pain.        Vitals:    05/14/25 1300   BP: (!) 115/91   Pulse: (!) 103   Resp: 18   Temp: 97 °F (36.1 °C)   TempSrc: Temporal   SpO2: 92%   Weight: 68.1 kg (150 lb 3.2 oz)   Height: 1.575 m (5' 2\")       Pain Scale: 5/10  Pain Location: Neck    \"Have you been to the ER, urgent care clinic since your last visit?  Hospitalized since your last visit?\"    NO    “Have you seen or consulted any other health care providers outside of [facility] since your last visit?”    NO                 4/21/2025    11:31 AM   PHQ-9    Little interest or pleasure in doing things 0   Feeling down, depressed, or hopeless 0   PHQ-2 Score 0   PHQ-9 Total Score 0            No data to display                     4/17/2025     1:42 PM   Amb Fall Risk Assessment and TUG Test   Do you feel unsteady or are you worried about falling?  no   2 or more falls in past year? no   Fall with injury in past year? no           5/14/2025     1:00 PM 4/21/2025    11:00 AM 4/17/2025     1:00 PM 2/16/2024     2:00 PM 12/8/2023     1:00 PM   ADL ASSESSMENT   Feeding yourself No Help Needed No Help Needed No Help Needed No Help Needed No Help Needed   Getting from bed to chair No Help Needed No Help Needed No Help Needed No Help Needed No Help Needed   Getting dressed No Help Needed No Help Needed No Help Needed No Help Needed No Help Needed   Bathing or showering No Help Needed No Help Needed No Help Needed No Help Needed No Help Needed   Walk across the room (includes cane/walker) No Help Needed No Help Needed No Help Needed No Help Needed No Help Needed   Using the telphone No Help Needed No Help Needed No Help Needed No Help Needed No Help Needed   Taking your medications No Help Needed No Help

## 2025-05-16 ENCOUNTER — TELEPHONE (OUTPATIENT)
Age: 75
End: 2025-05-16

## 2025-05-16 DIAGNOSIS — M54.50 ACUTE RIGHT-SIDED LOW BACK PAIN WITHOUT SCIATICA: ICD-10-CM

## 2025-05-16 DIAGNOSIS — M54.6 ACUTE MIDLINE THORACIC BACK PAIN: ICD-10-CM

## 2025-05-16 DIAGNOSIS — M54.2 POSTERIOR NECK PAIN: Primary | ICD-10-CM

## 2025-05-16 NOTE — TELEPHONE ENCOUNTER
Pt called, has trouble using her MyChart. I read her the note Sonny sent. Pt would like to know about possibly having PT.     Please call her back, again she has trouble using her MyChart.     Thanks!

## 2025-05-19 ENCOUNTER — TELEPHONE (OUTPATIENT)
Age: 75
End: 2025-05-19

## 2025-05-19 NOTE — TELEPHONE ENCOUNTER
Pt came in office stating she did not hear back last week in regards to possibly getting a referral to PT.     Please call back ASAP!     Thanks!

## 2025-05-27 ENCOUNTER — TELEPHONE (OUTPATIENT)
Age: 75
End: 2025-05-27

## 2025-05-27 RX ORDER — TIZANIDINE 2 MG/1
2 TABLET ORAL 3 TIMES DAILY PRN
Qty: 30 TABLET | Refills: 1 | Status: SHIPPED | OUTPATIENT
Start: 2025-05-27

## 2025-05-27 NOTE — TELEPHONE ENCOUNTER
Pt stated that she was seen on 5/14 and wants to know if she can be prescribed some muscle relaxer's, she stated that the provider is already aware of her situation, please advise call back # [phone].

## 2025-05-28 ENCOUNTER — TELEPHONE (OUTPATIENT)
Age: 75
End: 2025-05-28

## 2025-05-28 NOTE — TELEPHONE ENCOUNTER
Pt states physical therapy hasn't started yet and needs a muscle relaxer. Her girlfriend gave her Methocarbmol 750mg and it works great. Are you able to phone this med into [facility]?

## 2025-07-02 ENCOUNTER — TELEPHONE (OUTPATIENT)
Age: 75
End: 2025-07-02

## 2025-09-05 ENCOUNTER — OFFICE VISIT (OUTPATIENT)
Age: 75
End: 2025-09-05

## 2025-09-05 VITALS
DIASTOLIC BLOOD PRESSURE: 82 MMHG | HEART RATE: 92 BPM | OXYGEN SATURATION: 94 % | BODY MASS INDEX: 27.9 KG/M2 | SYSTOLIC BLOOD PRESSURE: 128 MMHG | WEIGHT: 151.6 LBS | TEMPERATURE: 97.6 F | HEIGHT: 62 IN | RESPIRATION RATE: 18 BRPM
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
## 2025-09-05 DIAGNOSIS — M17.0 PRIMARY OSTEOARTHRITIS OF BOTH KNEES: ICD-10-CM

## 2025-09-05 DIAGNOSIS — R53.83 OTHER FATIGUE: ICD-10-CM

## 2025-09-05 DIAGNOSIS — M25.562 CHRONIC PAIN OF BOTH KNEES: ICD-10-CM

## 2025-09-05 DIAGNOSIS — R06.09 OTHER FORM OF DYSPNEA: Primary | ICD-10-CM

## 2025-09-05 DIAGNOSIS — M25.561 CHRONIC PAIN OF BOTH KNEES: ICD-10-CM

## 2025-09-05 DIAGNOSIS — G89.29 CHRONIC PAIN OF BOTH KNEES: ICD-10-CM

## 2025-09-05 RX ORDER — MOMETASONE FUROATE 1 MG/G
CREAM TOPICAL
Qty: 45 G | Refills: 3 | Status: SHIPPED | OUTPATIENT
Start: 2025-09-05

## 2025-09-05 ASSESSMENT — PATIENT HEALTH QUESTIONNAIRE - PHQ9
2. FEELING DOWN, DEPRESSED OR HOPELESS: NOT AT ALL
SUM OF ALL RESPONSES TO PHQ QUESTIONS 1-9: 0
1. LITTLE INTEREST OR PLEASURE IN DOING THINGS: NOT AT ALL
SUM OF ALL RESPONSES TO PHQ QUESTIONS 1-9: 0